# Patient Record
Sex: FEMALE | Race: WHITE | NOT HISPANIC OR LATINO | Employment: OTHER | URBAN - METROPOLITAN AREA
[De-identification: names, ages, dates, MRNs, and addresses within clinical notes are randomized per-mention and may not be internally consistent; named-entity substitution may affect disease eponyms.]

---

## 2020-12-29 ENCOUNTER — HOSPITAL ENCOUNTER (EMERGENCY)
Facility: OTHER | Age: 85
Discharge: HOME | End: 2020-12-29
Attending: EMERGENCY MEDICINE
Payer: MEDICARE

## 2020-12-29 VITALS
OXYGEN SATURATION: 97 % | WEIGHT: 170 LBS | BODY MASS INDEX: 31.28 KG/M2 | HEIGHT: 62 IN | HEART RATE: 77 BPM | RESPIRATION RATE: 20 BRPM | SYSTOLIC BLOOD PRESSURE: 119 MMHG | TEMPERATURE: 98 F | DIASTOLIC BLOOD PRESSURE: 58 MMHG

## 2020-12-29 DIAGNOSIS — R50.9 FEVER: ICD-10-CM

## 2020-12-29 DIAGNOSIS — S00.83XA CONTUSION OF FACE, INITIAL ENCOUNTER: Primary | ICD-10-CM
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
## 2020-12-29 LAB
ALBUMIN SERPL BCP-MCNC: 3.6 G/DL (ref 3.5–5.2)
ALP SERPL-CCNC: 112 U/L (ref 55–135)
ALT SERPL W/O P-5'-P-CCNC: 14 U/L (ref 10–44)
ANION GAP SERPL CALC-SCNC: 10 MMOL/L (ref 8–16)
AST SERPL-CCNC: 15 U/L (ref 10–40)
BACTERIA #/AREA URNS HPF: NORMAL /HPF
BASOPHILS # BLD AUTO: 0.03 K/UL (ref 0–0.2)
BASOPHILS NFR BLD: 0.3 % (ref 0–1.9)
BILIRUB SERPL-MCNC: 0.5 MG/DL (ref 0.1–1)
BILIRUB UR QL STRIP: NEGATIVE
BUN SERPL-MCNC: 16 MG/DL (ref 8–23)
CALCIUM SERPL-MCNC: 9.3 MG/DL (ref 8.7–10.5)
CHLORIDE SERPL-SCNC: 103 MMOL/L (ref 95–110)
CLARITY UR: CLEAR
CO2 SERPL-SCNC: 23 MMOL/L (ref 23–29)
COLOR UR: YELLOW
CREAT SERPL-MCNC: 0.8 MG/DL (ref 0.5–1.4)
CTP QC/QA: YES
DIFFERENTIAL METHOD: ABNORMAL
EOSINOPHIL # BLD AUTO: 0 K/UL (ref 0–0.5)
EOSINOPHIL NFR BLD: 0.1 % (ref 0–8)
ERYTHROCYTE [DISTWIDTH] IN BLOOD BY AUTOMATED COUNT: 13.4 % (ref 11.5–14.5)
EST. GFR  (AFRICAN AMERICAN): >60 ML/MIN/1.73 M^2
EST. GFR  (NON AFRICAN AMERICAN): >60 ML/MIN/1.73 M^2
GLUCOSE SERPL-MCNC: 167 MG/DL (ref 70–110)
GLUCOSE UR QL STRIP: NEGATIVE
HCT VFR BLD AUTO: 40.9 % (ref 37–48.5)
HGB BLD-MCNC: 13.4 G/DL (ref 12–16)
HGB UR QL STRIP: ABNORMAL
IMM GRANULOCYTES # BLD AUTO: 0.05 K/UL (ref 0–0.04)
IMM GRANULOCYTES NFR BLD AUTO: 0.4 % (ref 0–0.5)
KETONES UR QL STRIP: NEGATIVE
LACTATE SERPL-SCNC: 2.7 MMOL/L (ref 0.5–2.2)
LEUKOCYTE ESTERASE UR QL STRIP: NEGATIVE
LYMPHOCYTES # BLD AUTO: 0.9 K/UL (ref 1–4.8)
LYMPHOCYTES NFR BLD: 7.4 % (ref 18–48)
MCH RBC QN AUTO: 31.5 PG (ref 27–31)
MCHC RBC AUTO-ENTMCNC: 32.8 G/DL (ref 32–36)
MCV RBC AUTO: 96 FL (ref 82–98)
MICROSCOPIC COMMENT: NORMAL
MONOCYTES # BLD AUTO: 0.7 K/UL (ref 0.3–1)
MONOCYTES NFR BLD: 5.9 % (ref 4–15)
NEUTROPHILS # BLD AUTO: 10.2 K/UL (ref 1.8–7.7)
NEUTROPHILS NFR BLD: 85.9 % (ref 38–73)
NITRITE UR QL STRIP: NEGATIVE
NRBC BLD-RTO: 0 /100 WBC
PH UR STRIP: 6 [PH] (ref 5–8)
PLATELET # BLD AUTO: 193 K/UL (ref 150–350)
PMV BLD AUTO: 9.4 FL (ref 9.2–12.9)
POTASSIUM SERPL-SCNC: 4.6 MMOL/L (ref 3.5–5.1)
PROT SERPL-MCNC: 7 G/DL (ref 6–8.4)
PROT UR QL STRIP: NEGATIVE
RBC # BLD AUTO: 4.25 M/UL (ref 4–5.4)
RBC #/AREA URNS HPF: 1 /HPF (ref 0–4)
SARS-COV-2 RDRP RESP QL NAA+PROBE: NEGATIVE
SODIUM SERPL-SCNC: 136 MMOL/L (ref 136–145)
SP GR UR STRIP: 1.01 (ref 1–1.03)
SQUAMOUS #/AREA URNS HPF: 10 /HPF
URN SPEC COLLECT METH UR: ABNORMAL
UROBILINOGEN UR STRIP-ACNC: NEGATIVE EU/DL
WBC # BLD AUTO: 11.88 K/UL (ref 3.9–12.7)
WBC #/AREA URNS HPF: 2 /HPF (ref 0–5)

## 2020-12-29 PROCEDURE — 96361 HYDRATE IV INFUSION ADD-ON: CPT

## 2020-12-29 PROCEDURE — U0002 COVID-19 LAB TEST NON-CDC: HCPCS | Performed by: EMERGENCY MEDICINE

## 2020-12-29 PROCEDURE — 85025 COMPLETE CBC W/AUTO DIFF WBC: CPT

## 2020-12-29 PROCEDURE — 99285 EMERGENCY DEPT VISIT HI MDM: CPT | Mod: 25

## 2020-12-29 PROCEDURE — 93010 EKG 12-LEAD: ICD-10-PCS | Mod: ,,, | Performed by: INTERNAL MEDICINE

## 2020-12-29 PROCEDURE — 87040 BLOOD CULTURE FOR BACTERIA: CPT

## 2020-12-29 PROCEDURE — 80053 COMPREHEN METABOLIC PANEL: CPT

## 2020-12-29 PROCEDURE — 83605 ASSAY OF LACTIC ACID: CPT

## 2020-12-29 PROCEDURE — 25000003 PHARM REV CODE 250: Performed by: EMERGENCY MEDICINE

## 2020-12-29 PROCEDURE — 96360 HYDRATION IV INFUSION INIT: CPT

## 2020-12-29 PROCEDURE — 93005 ELECTROCARDIOGRAM TRACING: CPT

## 2020-12-29 PROCEDURE — 93010 ELECTROCARDIOGRAM REPORT: CPT | Mod: ,,, | Performed by: INTERNAL MEDICINE

## 2020-12-29 PROCEDURE — 81000 URINALYSIS NONAUTO W/SCOPE: CPT

## 2020-12-29 RX ORDER — ACETAMINOPHEN 500 MG
1000 TABLET ORAL
Status: COMPLETED | OUTPATIENT
Start: 2020-12-29 | End: 2020-12-29

## 2020-12-29 RX ADMIN — ACETAMINOPHEN 1000 MG: 500 TABLET, FILM COATED ORAL at 05:12

## 2020-12-29 RX ADMIN — SODIUM CHLORIDE 500 ML: 0.9 INJECTION, SOLUTION INTRAVENOUS at 05:12

## 2020-12-29 NOTE — ED NOTES
"Pt complaining of pain to IV site started by EMS, removed per patient's request; IV attempt to RFA x 1 attempt, able to obtain blood but site unable to flush with saline; pt reports "I don't want you to stick me anymore, I'm too old for this"; bandages dry and intact, covering skin tears; no distress noted; will monitor closely  "

## 2020-12-29 NOTE — ED PROVIDER NOTES
Encounter Date: 12/29/2020    SCRIBE #1 NOTE: I, Bradford Banuelos, am scribing for, and in the presence of, Dr. Healy.       History     Chief Complaint   Patient presents with    Fall     pt was reaching for something and fell from wheel chair.  no loc has hematoma to left eye, skin tears that have bandages applied pta skin tear to left elbow, wrist, knee and calf     Time seen by provider: 2:43 PM    This is a 89 y.o. female who presents s/p fall that occurred approximately two hours ago. She was sitting her her wheelchair, leaning forward trying to pick something up off of the ground and fell forward. She has a hematoma to the left eye with abrasions to the left arm and bilateral lower extremities. She states that her teeth feel aligned.  Denies neck pain or back pain.  Denies LOC.  This is the extent of the patient's complaints at this time.    Of note, patient supposedly had a fever at the nursing home today and also a fever with EMS.  She is afebrile here.  The patient denies any symptoms.  She did receive her COVID vaccine yesterday.    The history is provided by the patient.     Review of patient's allergies indicates:   Allergen Reactions    Aspirin     Felodipine     Morphine      Past Medical History:   Diagnosis Date    Arthritis     Hip fracture, right     History of breast surgery      Past Surgical History:   Procedure Laterality Date    BREAST SURGERY      HIP SURGERY      HYSTERECTOMY       No family history on file.  Social History     Tobacco Use    Smoking status: Never Smoker    Smokeless tobacco: Never Used   Substance Use Topics    Alcohol use: No    Drug use: No     Review of Systems   Constitutional: Negative for fever.   HENT: Positive for facial swelling. Negative for dental problem and sore throat.         Positive for swelling to the left eye.   Respiratory: Negative for shortness of breath.    Cardiovascular: Negative for chest pain.   Gastrointestinal: Negative for nausea.    Genitourinary: Negative for dysuria.   Musculoskeletal: Negative for back pain.   Skin: Positive for wound. Negative for rash.        Positive for scattered abarsions   Neurological: Negative for weakness.   Hematological: Does not bruise/bleed easily.       Physical Exam     Initial Vitals [12/29/20 1344]   BP Pulse Resp Temp SpO2   121/75 (!) 112 (!) 24 98.9 °F (37.2 °C) 98 %      MAP       --         Physical Exam    Nursing note and vitals reviewed.  Constitutional: She appears well-developed and well-nourished. She is not diaphoretic. No distress.   HENT:   Head: Normocephalic.   Significant periorbital swelling and ecchymosis.  No chemosis or injected sclera.  No crepitus or step-offs.    Eyes: EOM are normal. Right eye exhibits no discharge. Left eye exhibits no discharge.   Neck: Normal range of motion.   Cardiovascular: Normal rate, regular rhythm and normal heart sounds. Exam reveals no gallop.    Pulmonary/Chest: Breath sounds normal. No respiratory distress. She has no wheezes. She has no rhonchi. She has no rales.   Abdominal: Soft. Bowel sounds are normal. She exhibits no distension. There is no abdominal tenderness. There is no rebound and no guarding.   Musculoskeletal: Normal range of motion.      Comments: No C-T-L midline spinal tenderness. 2+ DP/PT pulses. 1+ pitting edema to the lower extremity.    Neurological: She is alert and oriented to person, place, and time.   Skin: Skin is warm and dry. Abscess noted. No rash noted. No erythema. No pallor.   6 cm skin tear to the left lower extremity, to the distal aspect of the shin. 2 cm abrasion to the left knee. 2 cm abrasion to the right knee. 3 cm skin tear to the left elbow.   Psychiatric: She has a normal mood and affect. Her behavior is normal. Judgment and thought content normal.         ED Course   Procedures  Labs Reviewed   CBC W/ AUTO DIFFERENTIAL - Abnormal; Notable for the following components:       Result Value    MCH 31.5 (*)      Gran # (ANC) 10.2 (*)     Immature Grans (Abs) 0.05 (*)     Lymph # 0.9 (*)     Gran % 85.9 (*)     Lymph % 7.4 (*)     All other components within normal limits   COMPREHENSIVE METABOLIC PANEL - Abnormal; Notable for the following components:    Glucose 167 (*)     All other components within normal limits   URINALYSIS - Abnormal; Notable for the following components:    Occult Blood UA 1+ (*)     All other components within normal limits   LACTIC ACID, PLASMA - Abnormal; Notable for the following components:    Lactate (Lactic Acid) 2.7 (*)     All other components within normal limits   CULTURE, BLOOD   CULTURE, BLOOD   URINALYSIS MICROSCOPIC   LACTIC ACID, PLASMA   SARS-COV-2 RDRP GENE    Narrative:     This test utilizes isothermal nucleic acid amplification   technology to detect the SARS-CoV-2 RdRp nucleic acid segment.   The analytical sensitivity (limit of detection) is 125 genome   equivalents/mL.   A POSITIVE result implies infection with the SARS-CoV-2 virus;   the patient is presumed to be contagious.     A NEGATIVE result means that SARS-CoV-2 nucleic acids are not   present above the limit of detection. A NEGATIVE result should be   treated as presumptive. It does not rule out the possibility of   COVID-19 and should not be the sole basis for treatment decisions.   If COVID-19 is strongly suspected based on clinical and exposure   history, re-testing using an alternate molecular assay should be   considered.   This test is only for use under the Food and Drug   Administration s Emergency Use Authorization (EUA).   Commercial kits are provided by [employer].   Performance characteristics of the EUA have been independently   verified by [facility] Department of   Pathology and Laboratory Medicine.   _________________________________________________________________   The authorized Fact Sheet for Healthcare Providers and the authorized Fact   Sheet for Patients of the ID NOW COVID-19  are available on the FDA   website:     https://www.fda.gov/media/802706/download  https://www.fda.gov/media/677521/download           EKG Readings: (Independently Interpreted)   14:36   Rate of 103. Sinus tachycardia. LBBB. No other ST or ischemic changes.     ECG Results          EKG 12-lead (Final result)  Result time 12/29/20 18:28:29    Final result by Interface, Lab In [facility] (12/29/20 18:28:29)                 Narrative:    Test Reason : W19.XXXA,    Vent. Rate : 103 BPM     Atrial Rate : 103 BPM     P-R Int : 188 ms          QRS Dur : 120 ms      QT Int : 344 ms       P-R-T Axes : 075 074 259 degrees     QTc Int : 450 ms    Sinus tachycardia  Incomplete left bundle branch block  ST and T wave abnormality, consider inferior ischemia  ST and T wave abnormality, consider anterolateral ischemia  Abnormal ECG    Confirmed by Apple Lew MD ([id number]) on 12/29/2020 6:28:20 PM    Referred By: [email or web address]   SELF           Confirmed By:Apple Lew MD                            Imaging Results          CT Maxillofacial Without Contrast (Final result)  Result time 12/29/20 16:32:45    Final result by Melony Quintana MD (12/29/20 16:32:45)                 Impression:      Left frontal subcutaneous hematoma extending into the preseptal space with no additional findings identified.      Electronically signed by: Melony Quintana MD  Date:    12/29/2020  Time:    16:32             Narrative:    EXAMINATION:  CT MAXILLOFACIAL WITHOUT CONTRAST    CLINICAL HISTORY:  Facial trauma;    TECHNIQUE:  Axial low-dose images, coronal and sagittal reformations were performed through the paranasal sinuses.  Contrast was not administered.    COMPARISON:  None.    FINDINGS:  There is minimal mucosal thickening in the bilateral maxillary sinuses.  Otherwise, the pneumatized aspects of the skull and skull base are well aerated.  There is suggestion of right middle turbinates ostomy.  The ostiomeatal complexes are patent bilaterally.   No significant nasal septal deviation is present.  The intraorbital contents are normal.  Global cerebral volume loss and findings compatible with chronic microvascular ischemic white matter change are identified in the visualized intracranial contents.  There is a small left frontal subcutaneous hematoma which extends into the preseptal space.  The osseous structures are normal.                               X-Ray Chest AP Portable (Final result)  Result time 12/29/20 15:42:43    Final result by Devon Aviles MD (12/29/20 15:42:43)                 Impression:      No acute abnormality.      Electronically signed by: Devon Aviles MD  Date:    12/29/2020  Time:    15:42             Narrative:    EXAMINATION:  XR CHEST AP PORTABLE    CLINICAL HISTORY:  Fever, unspecified    TECHNIQUE:  Single frontal view of the chest was performed.    COMPARISON:  09/17/2013    FINDINGS:  The lungs are clear, with normal appearance of pulmonary vasculature and no pleural effusion or pneumothorax.    The cardiac silhouette is normal in size. The hilar and mediastinal contours are unremarkable.    Bones are intact.                               CT Cervical Spine Without Contrast (Final result)  Result time 12/29/20 14:45:15    Final result by Mateo Combs MD (12/29/20 14:45:15)                 Impression:      1. No definite acute displaced cervical spine fracture.  2. Generalized height loss is noted involving the C4 through C7 vertebral bodies without retropulsion, favored to be on a chronic degenerative basis.  However, clinical correlation with point tenderness, with consideration of MRI for further evaluation, as clinically warranted.  3. Additionally, there is mild nonspecific widening of the anterior disc spaces at C3-4 and C4-5.  There is no widening of the facet joints at this level.  No significant prevertebral soft tissue swelling.  These findings may be on a chronic degenerative basis as well, however again  clinical correlation would be recommended, with consideration of MRI to evaluate for ligamentous injury, as clinically warranted.  4. Degenerative findings, as described.      Electronically signed by: Mateo Combs  Date:    12/29/2020  Time:    14:45             Narrative:    EXAMINATION:  CT CERVICAL SPINE WITHOUT CONTRAST    CLINICAL HISTORY:  C-spine fracture, traumatic;    TECHNIQUE:  Low dose axial images, sagittal and coronal reformations were performed though the cervical spine.  Contrast was not administered.    COMPARISON:  None    FINDINGS:  Fractures: No definite acute displaced fractures are noted.  There is generalized height loss of the C4 through C7 vertebral bodies, which may be on a chronic degenerative basis.  Node definite discrete fracture lines are appreciated.  No significant retropulsion.    Alignment: There is mild widening of the anterior disc space at C3-4 and C4-5 with normal alignment of the posterior elements.  Atlanto-axial and atlanto-occipital joints: Atlanto-axial and atlanto-occipital intervals are not widened.  Well-corticated ossific fragments at the posterior aspects of the Landau axial joints may be degenerative or possibly sequela of remote trauma.  Facet joints: There is no traumatic facet joint widening.  Multilevel degenerate facet arthropathy.  Vertebral bodies: Diffuse osseous demineralization.  Discs: Disc osteophyte complex is noted at multiple levels.  Spinal canal and foraminal narrowing: Although CT does not optimally evaluate the soft tissue contents of the spinal canal and foramina, no critical spinal canal stenosis is suggested.    At C3-4, C4-5 there is moderate severe bilateral foraminal stenosis related to uncinate hypertrophy and facet arthropathy.  At C5-6, there is severe bilateral foraminal stenosis late to uncinate hypertrophy and facet arthropathy.  Paraspinal soft tissues: Atherosclerotic vascular calcifications are noted.  Heterogeneous thyroid  gland.    Upper Lungs:Clear                               CT Head Without Contrast (Final result)  Result time 12/29/20 14:31:29    Final result by Mateo Combs MD (12/29/20 14:31:29)                 Impression:      No acute intracranial findings.      Electronically signed by: Mateo Combs  Date:    12/29/2020  Time:    14:31             Narrative:    EXAMINATION:  CT HEAD WITHOUT CONTRAST    CLINICAL HISTORY:  Head trauma, minor (Age => 65y);    TECHNIQUE:  Low dose axial images were obtained through the head.  Coronal and sagittal reformations were also performed. Contrast was not administered.    COMPARISON:  None.    FINDINGS:  Blood: No acute intracranial hemorrhage.    Parenchyma: No definite loss of gray-white differentiation to suggest acute or subacute transcortical infarct. Generalized age-related parenchymal volume loss is noted.  Multifocal areas of white matter hypoattenuation may represent sequela of chronic small vessel ischemic disease.    Ventricles/Extra-axial spaces: No abnormal extra-axial fluid collection. Basal cisterns are patent.    Vessels: Calcific atherosclerosis    Orbits: Unremarkable.    Scalp: Left frontal and supraorbital scalp hematoma and surrounding soft tissue contusion.    Skull: There are no depressed skull fractures or destructive bone lesions.    Sinuses and mastoids: Findings suggestive of prior right-sided endoscopic sinus surgery.  Scattered relatively minor degree of mucosal thickening without definite fluid levels.  Equivocal small perforation of the anterior nasal septum, incompletely characterized.    Other findings: None                              X-Rays:   Independently Interpreted Readings:   Chest X-Ray: Trachea midline. No cardiomegaly. No effusion, edema or pneumothorax.      Medical Decision Making:   History:   I obtained history from: EMS provider.  Old Medical Records: I decided to obtain old medical records.  Old Records Summarized: other records  and records from clinic visits.  Initial Assessment:   2:43PM:  Patient is a an 89-year-old female who presents to the emergency department after a fall from her wheelchair.  Patient appears well, nontoxic.  She has no CTL midline tenderness.  She does have some left periorbital swelling and ecchymosis.  Will plan for imaging.  Patient also was supposedly febrile earlier today.  She is afebrile here with normal vitals.  However she did receive her COVID vaccine yesterday which could have triggered a fever.  However given her age and comorbidities, will plan for labs, cultures, will continue to follow and reassess.  Independently Interpreted Test(s):   I have ordered and independently interpreted X-rays - see prior notes.  I have ordered and independently interpreted EKG Reading(s) - see prior notes  Clinical Tests:   Lab Tests: Ordered and Reviewed  Radiological Study: Ordered and Reviewed  Medical Tests: Ordered and Reviewed    6:25PM:  Patient doing well, remains stable.  Her x-rays and CTs are negative for any acute injury.  Her labs otherwise unremarkable with the exception of a mildly elevated lactic acid of 2.7.  However there is no evidence of infection with her urine or chest x-ray.  Will plan for a repeat lactic acid.  I updated the patient and the family regarding the results along with plan.  Agreeable to plan and all questions answered.    7:12 PM:  Patient no longer wanting a repeat blood draw for the lactic acid.  Son states that the patient is just tired of being stuck.  They requested just to be discharged home as the patient feels ready to go back to her nursing home.  I did explain to him that the elevated lactic acid could indicate a systemic infection, and that it would be prudent to repeat a level to make sure it was improving.  However the patient and son are both competent and has full decision-making capacity.  They do understand they can return at any time for any concerns.  Her workup  otherwise has been negative.  Given that, will plan to discharge back to the nursing home in stable condition.  I do suspect that the fever is likely a reaction to the COVID vaccine.  I updated the patient and the family regarding the results and I counseled them regarding supportive care measures. I have discussed with the pt ED return warnings and need for close PCP f/u.  Pt agreeable to plan and all questions answered.  I feel that pt is stable for discharge and management as an outpatient and no further intervention is needed at this time.  Pt is comfortable returning to the ED if needed.  Will DC home in stable condition.              Scribe Attestation:   Scribe #1: I performed the above scribed service and the documentation accurately describes the services I performed. I attest to the accuracy of the note.    Attending Attestation:           Physician Attestation for Scribe:  Physician Attestation Statement for Scribe #1: I, Dr. Healy, reviewed documentation, as scribed by Bradford Banuelos in my presence, and it is both accurate and complete.                           Clinical Impression:       ICD-10-CM ICD-9-CM   1. Contusion of face, initial encounter  S00.83XA 920   2. Fall  W19.XXXA E888.9   3. Fever  R50.9 780.60                          ED Disposition Condition    Discharge Stable        ED Prescriptions     None        Follow-up Information     Follow up With Specialties Details Why Contact Info    Primary Care Physician                                           Evette Healy MD  12/29/20 1943       Evette Healy MD  12/29/20 1943

## 2021-01-03 LAB — BACTERIA BLD CULT: NORMAL

## 2021-07-12 ENCOUNTER — HOSPITAL ENCOUNTER (OUTPATIENT)
Dept: RADIOLOGY | Facility: HOSPITAL | Age: 86
Discharge: HOME | End: 2021-07-12
Attending: PHYSICIAN ASSISTANT
Payer: MEDICARE

## 2021-07-12 ENCOUNTER — OFFICE VISIT (OUTPATIENT)
Dept: ORTHOPEDICS | Facility: CLINIC | Age: 86
End: 2021-07-12
Payer: MEDICARE

## 2021-07-12 VITALS — WEIGHT: 176.5 LBS | BODY MASS INDEX: 30.13 KG/M2 | HEIGHT: 64 IN

## 2021-07-12 DIAGNOSIS — M25.562 PAIN IN BOTH KNEES, UNSPECIFIED CHRONICITY: ICD-10-CM

## 2021-07-12 DIAGNOSIS — M25.561 PAIN IN BOTH KNEES, UNSPECIFIED CHRONICITY: ICD-10-CM

## 2021-07-12 DIAGNOSIS — M17.0 PRIMARY OSTEOARTHRITIS OF BOTH KNEES: Primary | ICD-10-CM

## 2021-07-12 PROCEDURE — 1159F PR MEDICATION LIST DOCUMENTED IN MEDICAL RECORD: ICD-10-PCS | Mod: S$GLB,,, | Performed by: PHYSICIAN ASSISTANT

## 2021-07-12 PROCEDURE — 73562 X-RAY EXAM OF KNEE 3: CPT | Mod: TC,50

## 2021-07-12 PROCEDURE — 20610 LARGE JOINT ASPIRATION/INJECTION: BILATERAL KNEE: ICD-10-PCS | Mod: 50,S$GLB,, | Performed by: PHYSICIAN ASSISTANT

## 2021-07-12 PROCEDURE — 99999 PR PBB SHADOW E&M-EST. PATIENT-LVL IV: CPT | Mod: PBBFAC,,, | Performed by: PHYSICIAN ASSISTANT

## 2021-07-12 PROCEDURE — 1157F PR ADVANCE CARE PLAN OR EQUIV PRESENT IN MEDICAL RECORD: ICD-10-PCS | Mod: S$GLB,,, | Performed by: PHYSICIAN ASSISTANT

## 2021-07-12 PROCEDURE — 1125F AMNT PAIN NOTED PAIN PRSNT: CPT | Mod: S$GLB,,, | Performed by: PHYSICIAN ASSISTANT

## 2021-07-12 PROCEDURE — 73562 X-RAY EXAM OF KNEE 3: CPT | Mod: 26,LT,, | Performed by: RADIOLOGY

## 2021-07-12 PROCEDURE — 73562 PR  X-RAY KNEE 3 VIEW: ICD-10-PCS | Mod: 26,LT,, | Performed by: RADIOLOGY

## 2021-07-12 PROCEDURE — 1125F PR PAIN SEVERITY QUANTIFIED, PAIN PRESENT: ICD-10-PCS | Mod: S$GLB,,, | Performed by: PHYSICIAN ASSISTANT

## 2021-07-12 PROCEDURE — 73562 X-RAY EXAM OF KNEE 3: CPT | Mod: 26,RT,, | Performed by: RADIOLOGY

## 2021-07-12 PROCEDURE — 20610 DRAIN/INJ JOINT/BURSA W/O US: CPT | Mod: 50,S$GLB,, | Performed by: PHYSICIAN ASSISTANT

## 2021-07-12 PROCEDURE — 99203 PR OFFICE/OUTPT VISIT, NEW, LEVL III, 30-44 MIN: ICD-10-PCS | Mod: 25,S$GLB,, | Performed by: PHYSICIAN ASSISTANT

## 2021-07-12 PROCEDURE — 99203 OFFICE O/P NEW LOW 30 MIN: CPT | Mod: 25,S$GLB,, | Performed by: PHYSICIAN ASSISTANT

## 2021-07-12 PROCEDURE — 1157F ADVNC CARE PLAN IN RCRD: CPT | Mod: S$GLB,,, | Performed by: PHYSICIAN ASSISTANT

## 2021-07-12 PROCEDURE — 1159F MED LIST DOCD IN RCRD: CPT | Mod: S$GLB,,, | Performed by: PHYSICIAN ASSISTANT

## 2021-07-12 PROCEDURE — 99999 PR PBB SHADOW E&M-EST. PATIENT-LVL IV: ICD-10-PCS | Mod: PBBFAC,,, | Performed by: PHYSICIAN ASSISTANT

## 2021-07-12 RX ORDER — LACTULOSE 10 G/15ML
20 SOLUTION ORAL; RECTAL DAILY PRN
Status: ON HOLD | COMMUNITY
Start: 2021-03-22 | End: 2023-03-09 | Stop reason: HOSPADM

## 2021-07-12 RX ORDER — TRIAMCINOLONE ACETONIDE 40 MG/ML
40 INJECTION, SUSPENSION INTRA-ARTICULAR; INTRAMUSCULAR
Status: DISCONTINUED | OUTPATIENT
Start: 2021-07-12 | End: 2021-07-12 | Stop reason: HOSPADM

## 2021-07-12 RX ORDER — METHENAMINE HIPPURATE 1000 MG/1
1 TABLET ORAL 2 TIMES DAILY
COMMUNITY
Start: 2021-06-15 | End: 2023-03-05 | Stop reason: CLARIF

## 2021-07-12 RX ORDER — POTASSIUM CHLORIDE 20 MEQ/1
TABLET, EXTENDED RELEASE ORAL
COMMUNITY
Start: 2021-06-11 | End: 2023-03-05 | Stop reason: SDUPTHER

## 2021-07-12 RX ORDER — IBUPROFEN 400 MG/1
400 TABLET ORAL 3 TIMES DAILY
COMMUNITY
Start: 2021-06-23 | End: 2022-11-25

## 2021-07-12 RX ORDER — OLOPATADINE HYDROCHLORIDE 2 MG/ML
SOLUTION/ DROPS OPHTHALMIC
COMMUNITY
Start: 2021-07-07 | End: 2023-03-05 | Stop reason: CLARIF

## 2021-07-12 RX ORDER — FUROSEMIDE 20 MG/1
20 TABLET ORAL 2 TIMES DAILY
COMMUNITY
Start: 2021-07-11 | End: 2023-03-05 | Stop reason: ALTCHOICE

## 2021-07-12 RX ORDER — MIRABEGRON 50 MG/1
1 TABLET, FILM COATED, EXTENDED RELEASE ORAL DAILY
COMMUNITY
Start: 2021-07-01

## 2021-07-12 RX ORDER — HYDROCORTISONE 25 MG/G
CREAM TOPICAL
COMMUNITY
Start: 2021-03-03 | End: 2023-03-05 | Stop reason: CLARIF

## 2021-07-12 RX ADMIN — TRIAMCINOLONE ACETONIDE 40 MG: 40 INJECTION, SUSPENSION INTRA-ARTICULAR; INTRAMUSCULAR at 09:07

## 2022-11-24 ENCOUNTER — HOSPITAL ENCOUNTER (EMERGENCY)
Facility: HOSPITAL | Age: 87
Discharge: HOME | End: 2022-11-25
Attending: EMERGENCY MEDICINE
Payer: MEDICARE

## 2022-11-24 DIAGNOSIS — S01.81XA FOREHEAD LACERATION, INITIAL ENCOUNTER: Primary | ICD-10-CM

## 2022-11-24 DIAGNOSIS — I60.9 SAH (SUBARACHNOID HEMORRHAGE): ICD-10-CM
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
## 2022-11-24 DIAGNOSIS — S80.01XA CONTUSION OF RIGHT KNEE: ICD-10-CM

## 2022-11-24 PROBLEM — S06.6XAA TRAUMATIC SUBARACHNOID HEMORRHAGE WITH UNKNOWN LOSS OF CONSCIOUSNESS STATUS: Status: ACTIVE | Noted: 2022-11-24

## 2022-11-24 PROCEDURE — 99284 EMERGENCY DEPT VISIT MOD MDM: CPT | Mod: 25,,, | Performed by: EMERGENCY MEDICINE

## 2022-11-24 PROCEDURE — 99284 PR EMERGENCY DEPT VISIT,LEVEL IV: ICD-10-PCS | Mod: 25,,, | Performed by: EMERGENCY MEDICINE

## 2022-11-24 PROCEDURE — 12013 RPR F/E/E/N/L/M 2.6-5.0 CM: CPT

## 2022-11-24 PROCEDURE — 12013 RPR F/E/E/N/L/M 2.6-5.0 CM: CPT | Mod: ,,, | Performed by: EMERGENCY MEDICINE

## 2022-11-24 PROCEDURE — 12013 PR RESUPERF WND FACE 2.6-5 CM: ICD-10-PCS | Mod: ,,, | Performed by: EMERGENCY MEDICINE

## 2022-11-24 PROCEDURE — 25000003 PHARM REV CODE 250: Performed by: EMERGENCY MEDICINE

## 2022-11-24 PROCEDURE — 99284 EMERGENCY DEPT VISIT MOD MDM: CPT | Mod: 25

## 2022-11-24 RX ORDER — LIDOCAINE HYDROCHLORIDE 10 MG/ML
5 INJECTION, SOLUTION EPIDURAL; INFILTRATION; INTRACAUDAL; PERINEURAL
Status: DISCONTINUED | OUTPATIENT
Start: 2022-11-24 | End: 2022-11-24

## 2022-11-24 RX ORDER — LIDOCAINE HYDROCHLORIDE 10 MG/ML
10 INJECTION, SOLUTION EPIDURAL; INFILTRATION; INTRACAUDAL; PERINEURAL
Status: COMPLETED | OUTPATIENT
Start: 2022-11-24 | End: 2022-11-24

## 2022-11-24 RX ADMIN — LIDOCAINE HYDROCHLORIDE 100 MG: 10 INJECTION, SOLUTION EPIDURAL; INFILTRATION; INTRACAUDAL; PERINEURAL at 08:11

## 2022-11-25 VITALS
HEART RATE: 77 BPM | SYSTOLIC BLOOD PRESSURE: 121 MMHG | BODY MASS INDEX: 29.18 KG/M2 | OXYGEN SATURATION: 97 % | WEIGHT: 170 LBS | TEMPERATURE: 99 F | DIASTOLIC BLOOD PRESSURE: 56 MMHG | RESPIRATION RATE: 16 BRPM

## 2022-11-25 PROCEDURE — 99284 PR EMERGENCY DEPT VISIT,LEVEL IV: ICD-10-PCS | Mod: ,,, | Performed by: PHYSICIAN ASSISTANT

## 2022-11-25 PROCEDURE — 25000003 PHARM REV CODE 250: Performed by: EMERGENCY MEDICINE

## 2022-11-25 PROCEDURE — 99284 EMERGENCY DEPT VISIT MOD MDM: CPT | Mod: ,,, | Performed by: PHYSICIAN ASSISTANT

## 2022-11-25 RX ORDER — BACITRACIN ZINC 500 [USP'U]/G
1 OINTMENT TOPICAL
Status: COMPLETED | OUTPATIENT
Start: 2022-11-25 | End: 2022-11-25

## 2022-11-25 RX ORDER — ACETAMINOPHEN 325 MG/1
650 TABLET ORAL
Status: COMPLETED | OUTPATIENT
Start: 2022-11-25 | End: 2022-11-25

## 2022-11-25 RX ADMIN — ACETAMINOPHEN 650 MG: 325 TABLET ORAL at 04:11

## 2022-11-25 RX ADMIN — BACITRACIN 1 EACH: 500 OINTMENT TOPICAL at 04:11

## 2022-11-25 NOTE — CONSULTS
[facility] - Emergency Dept  Neurosurgery  Consult Note    Consults  Subjective:     Chief Complaint/Reason for Admission: tSAH    History of Present Illness: 89-year-old female who is very hard of hearing presents from nursing home with head lac after mechanical fall today.  EMS states pt fell when trying to stand from a wheelchair and hit her head on the ground. Patient is unable to give any history except that she hit her head. Unknown LOC. Denies N/V or new focal deficits. Only complaint is facial pain. Surrounding laceration. Family denies pt taking any blood thinners. NSGY consulted for small left frontal tSAH.       (Not in a hospital admission)      Review of patient's allergies indicates:   Allergen Reactions    Aspirin     Felodipine     Morphine        Past Medical History:   Diagnosis Date    Arthritis     Hip fracture, right     History of breast surgery      Past Surgical History:   Procedure Laterality Date    BREAST SURGERY      HIP SURGERY      HYSTERECTOMY       Family History    None       Tobacco Use    Smoking status: Never    Smokeless tobacco: Never   Substance and Sexual Activity    Alcohol use: No    Drug use: No    Sexual activity: Not on file     Review of Systems  Objective:     Weight: 77.1 kg (170 lb)  Body mass index is 29.18 kg/m².  Vital Signs (Most Recent):  Temp: 98.9 °F (37.2 °C) (11/24/22 2019)  Pulse: 81 (11/24/22 2310)  Resp: 18 (11/24/22 2310)  BP: (!) 125/58 (11/24/22 2310)  SpO2: 98 % (11/24/22 2310)   Vital Signs (24h Range):  Temp:  [98.9 °F (37.2 °C)] 98.9 °F (37.2 °C)  Pulse:  [77-81] 81  Resp:  [18-20] 18  SpO2:  [97 %-98 %] 98 %  BP: (125-155)/(58-68) 125/58       Physical Exam  General: well developed, well nourished, no distress.   Head: normocephalic, right forehead laceration repaired with surrounding ecchymosis and edema, periorbital ecchymosis on the right   Neurologic: Alert and oriented. Thought content appropriate.  GCS: Motor: 6/Verbal: 5/Eyes: 4  GCS Total: 15  Mental Status: Awake, Alert, Oriented x3  Cranial nerves: face symmetric, tongue midline, CN II-XII grossly intact.   Eyes: pupils equal, round, reactive to light with accomodation, EOMI.   Sensory: intact to light touch throughout  Motor Strength:Moves all extremities spontaneously with good tone at least antigravity. No abnormal movements seen.   DTR's - 2 + and symmetric in UE and LE  Pronator Drift: no drift noted  Finger-to-nose: Intact bilaterally    Significant Labs:  No results for input(s): GLU, NA, K, CL, CO2, BUN, CREATININE, CALCIUM, MG in the last 48 hours.  No results for input(s): WBC, HGB, HCT, PLT in the last 48 hours.  No results for input(s): LABPT, INR, APTT in the last 48 hours.  Microbiology Results (last 7 days)       ** No results found for the last 168 hours. **          All pertinent labs from the last 24 hours have been reviewed.    Significant Diagnostics:  CT: CT Head Without Contrast    Result Date: 11/24/2022  Small amount of subarachnoid hemorrhage on the left, as discussed above. Otherwise chronic appearing intracranial changes are noted. Extracranial frontal soft tissue injury is noted, there is no evidence for underlying calvarial fracture. Suspected soft tissue nodule or sebaceous cyst of the subcutaneous fat planes of the superior left paramidline posterior vertex region, as discussed above. Paranasal sinus findings as above. This report was flagged in Epic as abnormal. Findings were reported to Dr. Simmons in the ER at the time of dictation. Electronically signed by: David Gonzales Date:    11/24/2022 Time:    21:45     Assessment/Plan:     Traumatic subarachnoid hemorrhage with unknown loss of consciousness status  89 yo female, very hard of hearing, who presents s/p mechanical fall with head injury and tiny left frontal tSAH seen on CTH. Pt remains at her neuro baseline.    -No acute neurosurgical intervention  -Recommend 6h stability CTH  -q2 hour neuro  checks  -All labs and diagnostics reviewed  -CBC, PT/INR, PTT  -SBP <160   -Na >135  -If repeat CTH stable, no follow up necessary. Continue to monitor clinically, notify NSGY immediately with any changes in neuro status  -Will review with attending staff Dr. Johnnie Mcgrath, PA-C  Neurosurgery  [facility] - Emergency Dept

## 2022-11-25 NOTE — HPI
89-year-old female who is very hard of hearing presents from nursing home with head lac after mechanical fall today.  EMS states pt fell when trying to stand from a wheelchair and hit her head on the ground. Patient is unable to give any history except that she hit her head. Unknown LOC. Denies N/V or new focal deficits. Only complaint is facial pain. Surrounding laceration. Family denies pt taking any blood thinners. NSGY consulted for small left frontal tSAH.

## 2022-11-25 NOTE — ED PROVIDER NOTES
Encounter Date: 11/24/2022       History     Chief Complaint   Patient presents with    Fall     Coming from [place]. Pt stood from wheelchair and fell from a standing position and struck her head. Laceration noted to forehead. Unwitnessed fall so unknown of LOC. - blood thinners.      89-year-old female who is very hard of hearing presents from nursing home.  Report is that she was getting up from a wheelchair when she fell forward and hit her head.  No further history is available.  Patient is unable to give any history except that she hit her head.  Unable to get review of systems.  The patients available PMH, PSH, Social History, medications, allergies, and triage vital signs were reviewed just prior to their medical evaluation.       Review of patient's allergies indicates:   Allergen Reactions    Aspirin     Felodipine     Morphine      Past Medical History:   Diagnosis Date    Arthritis     Hip fracture, right     History of breast surgery      Past Surgical History:   Procedure Laterality Date    BREAST SURGERY      HIP SURGERY      HYSTERECTOMY       History reviewed. No pertinent family history.  Social History     Tobacco Use    Smoking status: Never    Smokeless tobacco: Never   Substance Use Topics    Alcohol use: No    Drug use: No     Review of Systems   Unable to perform ROS: Age     Physical Exam     Initial Vitals   BP Pulse Resp Temp SpO2   11/24/22 2016 11/24/22 2016 11/24/22 2016 11/24/22 2019 11/24/22 2016   (!) 155/68 77 20 98.9 °F (37.2 °C) 97 %      MAP       --                Physical Exam    Nursing note and vitals reviewed.  Constitutional: She appears well-developed and well-nourished. She is not diaphoretic. No distress.   HENT:   Nose: Nose normal.   3cm laceration to right forehead, very hard of hearing   Eyes: Conjunctivae are normal. Right eye exhibits no discharge. Left eye exhibits no discharge.   Neck: Neck supple.   No midline neck pain   Normal range of  motion.  Cardiovascular:  Normal rate, regular rhythm and normal heart sounds.     Exam reveals no gallop and no friction rub.       No murmur heard.  Pulmonary/Chest: Breath sounds normal. No respiratory distress. She has no wheezes. She has no rhonchi. She has no rales.   Abdominal: Abdomen is soft. She exhibits no distension. There is no abdominal tenderness. There is no rebound and no guarding.   Musculoskeletal:         General: No tenderness or edema. Normal range of motion.      Cervical back: Normal range of motion and neck supple.      Comments: Palpated all extremities, no ttp     Neurological: She is alert and oriented to person, place, and time. GCS score is 15. GCS eye subscore is 4. GCS verbal subscore is 5. GCS motor subscore is 6.   Skin: Skin is warm and dry. No rash noted. No erythema.   Psychiatric: She has a normal mood and affect. Her behavior is normal. Judgment and thought content normal.       ED Course   Lac Repair    Date/Time: 11/24/2022 10:05 PM  Performed by: Luis Simmons MD  Authorized by: Luis Simmons MD     Consent:     Consent obtained:  Verbal  Universal protocol:     Procedure explained and questions answered to patient or proxy's satisfaction: yes      Relevant documents present and verified: yes      Patient identity confirmed:  Verbally with patient  Anesthesia:     Anesthesia method:  Local infiltration    Local anesthetic:  Lidocaine 1% w/o epi  Laceration details:     Location:  Face    Face location:  Forehead    Length (cm):  3  Pre-procedure details:     Preparation:  Patient was prepped and draped in usual sterile fashion and imaging obtained to evaluate for foreign bodies  Exploration:     Limited defect created (wound extended): no      Hemostasis achieved with:  Direct pressure    Imaging outcome: foreign body not noted      Wound exploration: wound explored through full range of motion and entire depth of wound visualized      Wound extent: no foreign  bodies/material noted, no muscle damage noted, no underlying fracture noted and no vascular damage noted      Contaminated: no    Treatment:     Area cleansed with:  Saline    Amount of cleaning:  Standard    Irrigation solution:  Sterile saline    Irrigation volume:  100    Irrigation method:  Pressure wash    Visualized foreign bodies/material removed: no      Debridement:  None    Undermining:  None    Scar revision: no    Skin repair:     Repair method:  Sutures    Suture size:  4-0    Suture material:  Nylon    Suture technique:  Simple interrupted    Number of sutures:  5  Approximation:     Approximation:  Close  Repair type:     Repair type:  Simple  Post-procedure details:     Dressing:  Antibiotic ointment    Procedure completion:  Tolerated well, no immediate complications  Labs Reviewed   HIV 1 / 2 ANTIBODY   HEPATITIS C ANTIBODY          Imaging Results              CT Head Without Contrast (Final result)  Result time 11/25/22 04:07:49      Final result by David Gonzales MD (11/25/22 04:07:49)                   Impression:      Previously identified suspected small focus of subarachnoid hemorrhage appears stable without evidence for significant interval detrimental change.    Chronic appearing intracranial changes are noted.    Stable extracranial soft tissue focus, as above, likely representing a soft tissue nodule or sebaceous cyst of the subcutaneous fat planes.    Electronically signed by resident: Avni Hinojosa  Date:    11/25/2022  Time:    03:40    Electronically signed by: David Gonzales  Date:    11/25/2022  Time:    04:07               Narrative:    EXAMINATION:  CT HEAD WITHOUT CONTRAST    CLINICAL HISTORY:  Subarachnoid hemorrhage (SAH) suspected;    TECHNIQUE:  Low dose axial CT images obtained throughout the head without the use of intravenous contrast.  Axial, sagittal and coronal reconstructions were performed.    COMPARISON:  CT head 11/24/2022,  12/29/2020    FINDINGS:  INTRACRANIAL COMPARTMENT:    Prominence of the ventricles and sulci compatible with age-related generalized cerebral volume loss.  No hydrocephalus.  Midline falcine calcifications/mineralization noted.    Mild patchy hypoattenuation in the supratentorial white matter, nonspecific but most likely reflecting chronic microvascular ischemic changes.  Small area of hyperdensity involving a sulcus of the left frontal lobe (axial series 2, image 21; coronal series 601, image 84), is again noted, appearing stable when compared to the most recent prior examination, and suspicious for a small area of subarachnoid hemorrhage.  No parenchymal mass, edema or major vascular distribution infarct.    No extra-axial blood or fluid collections.    SKULL/EXTRACRANIAL CONTENTS (limited evaluation):    No fracture.  Stable extracranial, mildly dense round focus again measuring 1.3 cm in the subcutaneous fat planes of the superior left paramidline vertex.  Mastoid air cells and paranasal sinuses appears stable.                                       X-Ray Knee 3 View Right (Final result)  Result time 11/24/22 22:36:52      Final result by Solis Thompson MD (11/24/22 22:36:52)                   Impression:      Mild degenerative changes with no acute radiographic abnormality.  See above comments.      Electronically signed by: Solis Thompson  Date:    11/24/2022  Time:    22:36               Narrative:    EXAMINATION:  XR KNEE 3 VIEW RIGHT    CLINICAL HISTORY:  Contusion of right knee, initial encounter    TECHNIQUE:  AP, lateral, and Merchant views of the right knee were performed.    COMPARISON:  07/12/2021    FINDINGS:  Alignment is satisfactory.  No acute fracture, subluxation or dislocation.  Small joint effusion.    Mild patellar spurring similar to the prior study.    Mild joint space narrowing of the medial compartment.                                        CT Head Without Contrast (Final result)  Result  time 11/24/22 21:45:12      Final result by David Gonzales MD (11/24/22 21:45:12)                   Impression:      Small amount of subarachnoid hemorrhage on the left, as discussed above.    Otherwise chronic appearing intracranial changes are noted.    Extracranial frontal soft tissue injury is noted, there is no evidence for underlying calvarial fracture.    Suspected soft tissue nodule or sebaceous cyst of the subcutaneous fat planes of the superior left paramidline posterior vertex region, as discussed above.    Paranasal sinus findings as above.    This report was flagged in Epic as abnormal.    Findings were reported to Dr. Simmons in the ER at the time of dictation.      Electronically signed by: David Gonzales  Date:    11/24/2022  Time:    21:45               Narrative:    EXAMINATION:  CT HEAD WITHOUT CONTRAST    CLINICAL HISTORY:  Facial trauma, blunt;    TECHNIQUE:  Low dose axial images were obtained through the head.  Coronal and sagittal reformations were also performed. Contrast was not administered.    COMPARISON:  CT examination of the brain without contrast December 29, 2020    FINDINGS:  The ventricular system and sulcal pattern demonstrate chronic involutional change.  Chronic appearing white matter changes noted.  Midline falcine calcifications/mineralization is noted.    As seen on axial image 20 and 21, sagittal image 129 through 131, coronal image 77, there is a small area of hyperdensity most consistent with a small area of subarachnoid hemorrhage involving a sulcus of the left frontal lobe.  A large amount of subarachnoid hemorrhage is not seen.  There is no evidence for parenchymal hemorrhage, no evidence for subdural epidural hemorrhage.    There is no evidence for intracranial mass, mass effect or midline shift.  Appropriate CSF spaces are seen at the skull base.    As seen on axial image 29 there is an area of extracranial mildly dense round object measuring approximately 12.3 mm  within the subcutaneous fat planes of the superior left paramidline vertex, this may relate to a soft tissue nodule or proteinaceous cyst, clinical and historical correlation is needed.    There is appearance of soft tissue injury overlying the frontal location including air density within the soft tissues that may relate to laceration, for which clinical and historical correlation is needed.  There is no evidence for underlying calvarial fracture.    The visualized osseous structures appear intact.  Chronic change noted.  The mastoid air cells appear well aerated.  Opacity along the external auditory canals may relate to cerumen.    There is appearance of may relate to prior paranasal sinus postoperative change.  Mild paranasal sinus mucosal thickening is noted.  Mild osseous wall thickening of the right maxillary antrum may relate to chronic sinus disease.  The visualized orbits appear intact.  Chronic change noted.                                       CT Cervical Spine Without Contrast (Final result)  Result time 11/24/22 21:42:55      Final result by David Gonzales MD (11/24/22 21:42:55)                   Impression:      Chronic changes are noted, appearing similar to the prior examination, correlation for any specific level of symptomatology is needed.    On close evaluation of available imaging, there is no evidence for acute cervical spine fracture deformity.      Electronically signed by: David Gonzales  Date:    11/24/2022  Time:    21:42               Narrative:    EXAMINATION:  CT CERVICAL SPINE WITHOUT CONTRAST    CLINICAL HISTORY:  Neck trauma (Age >= 65y);    TECHNIQUE:  Low dose axial images, sagittal and coronal reformations were performed though the cervical spine.  Contrast was not administered.    COMPARISON:  CT examination of the cervical spine December 29, 2020    FINDINGS:  The osseous structures demonstrate chronic change, there is diminished mineralization and there is degenerative  change.  There is minimal grade 1 anterolisthesis of C3 with respect to C4, mild grade 1 anterolisthesis of C3 with respect to C4 and C4 with respect to C5 and C7 with respect to T1.  Chronic appearing endplate changes are noted, marginal osteophyte formation is noted.  Overall appearance of vertebral body height and alignment is stable compared to the prior examination, there is no evidence for high-grade spondylolisthesis, there is no evidence for high-grade or acute compression fracture deformity.  Facet arthropathy is noted in there is appearance of facet fusion on the right and to a lesser extent the left at C2-3.  There is no evidence for facet dislocation and no evidence for facet fracture.  The occipital condyles articulate appropriately with the superior articular facets of C1 at the craniocervical junction.    There is chronic change throughout the cervical spine appearing stable when compared to the prior examination without evidence for high-grade spinal canal stenosis or large focal disc protrusion.  Multilevel facet arthropathy in foraminal narrowing and stenosis noted appearing stable.    On close evaluation of available imaging, there is no evidence for acute cervical spine fracture deformity.    There is heterogeneity of the thyroid including small calcifications.  The visualized lung apices appear clear.                                       Medications   LIDOcaine (PF) 10 mg/ml (1%) injection 100 mg (100 mg Infiltration Given 11/24/22 2045)   acetaminophen tablet 650 mg (650 mg Oral Given 11/25/22 0442)   bacitracin zinc ointment 1 each (1 each Topical (Top) Given 11/25/22 0442)     Medical Decision Making:   History:   Old Medical Records: I decided to obtain old medical records.  Old Records Summarized: other records.       <> Summary of Records: Packet from NH  Clinical Tests:   Radiological Study: Ordered and Reviewed  ED Management:  89-year-old female presents after a ground level fall.   Forehead trauma.  Vitals with HTN.  PE as above.  Laceration repair as above.  CT head with very small SAH.  C spine without fracture.  Discussed with NS who evaluated at bedside.  Repeat CT head ordered.  Unchanged.  Will DC home.  Patient will f/up with NS in clinic.  Patient will return to ED for worsening symptoms, inability to eat/drink, fever greater than 100.4, or any other concerns. Did bedside teaching with return precautions.  All questions answered.  The patient acknowledges understanding.  Gave verbal discharge instructions.   Other:   I have discussed this case with another health care provider.       <> Summary of the Discussion: NS, ed eval                        Clinical Impression:   Final diagnoses:  [S80.01XA] Contusion of right knee  [W19.XXXA] Fall, initial encounter  [S01.81XA] Forehead laceration, initial encounter (Primary)  [I60.9] SAH (subarachnoid hemorrhage)      ED Disposition Condition    Discharge Stable          ED Prescriptions    None       Follow-up Information       Follow up With Specialties Details Why Contact Info    [facility] - Emergency Dept Emergency Medicine  Return to ED for worsening symptoms, inability to eat/drink, fever greater than 100.4, or any other concerns. [address] [facility] [id number] [phone]    Jenna Blair MD Neurosurgery, Pediatric Neurosurgery   [address] [facility]  Department of Neurosurgery - 7th Floor  [facility] [address]  [phone]            Level of Complexity:  High, level 5.      Luis Simmons MD  11/25/22 0513

## 2022-11-25 NOTE — DISCHARGE INSTRUCTIONS
Sutures out in 7 days.    Apply bacitracin twice a day until healed.    Our goal in the emergency department is to always give you outstanding care and exceptional service. You may receive a survey by mail or e-mail in the next week regarding your experience in our ED. We would greatly appreciate your completing and returning the survey. Your feedback provides us with a way to recognize our staff who give very good care and it helps us learn how to improve when your experience was below our aspiration of excellence.

## 2022-11-25 NOTE — ED NOTES
Bisi Bazan, a 89 y.o. female presents to the ED via EMS w/ complaint of bleeding head lac from fall a couple hours ago. EMS states pt fell when trying to stand from a wheelchair and hit her head on the ground. Reports HA. Family at bedside denies pt taking any blood thinners. Pt AAOx4    Triage note:  Chief Complaint   Patient presents with    Fall     Coming from [place]. Pt stood from wheelchair and fell from a standing position and struck her head. Laceration noted to forehead. Unwitnessed fall so unknown of LOC. - blood thinners.      Review of patient's allergies indicates:   Allergen Reactions    Aspirin     Felodipine     Morphine      Past Medical History:   Diagnosis Date    Arthritis     Hip fracture, right     History of breast surgery     Patient identifiers for Bisi Bazan checked and correct.    LOC: The patient is awake, alert and aware of environment with an appropriate affect, the patient is oriented x 4 and speaking appropriately.  APPEARANCE: Patient resting comfortably and in no acute distress, patient is clean and well groomed, patient's clothing is properly fastened.  SKIN: The skin is warm and dry, color consistent with ethnicity, patient has normal skin turgor and moist mucus membranes. +R sided forehead lac, bruising and swelling to R eye.  MUSCULOSKELETAL: Patient moving all extremities well, no obvious swelling or deformities noted.  RESPIRATORY: Airway is open and patent, respirations are spontaneous and even, patient has a normal effort and rate.  CARDIAC: Patient has a normal rate and rhythm, no periphreal edema noted, capillary refill < 3 seconds. Normal +2 pedal pulses present.  ABDOMEN: Soft and non tender to palpation, no distention noted. Patient denies any nausea, vomiting, diarrhea, or constipation.   NEUROLOGIC: Eyes open spontaneously, PERRL, behavior appropriate to situation, follows commands, facial expression symmetrical, bilateral hand  grasp equal and even, purposeful motor response noted, normal sensation in all extremities.

## 2022-11-25 NOTE — SUBJECTIVE & OBJECTIVE
(Not in a hospital admission)      Review of patient's allergies indicates:   Allergen Reactions    Aspirin     Felodipine     Morphine        Past Medical History:   Diagnosis Date    Arthritis     Hip fracture, right     History of breast surgery      Past Surgical History:   Procedure Laterality Date    BREAST SURGERY      HIP SURGERY      HYSTERECTOMY       Family History    None       Tobacco Use    Smoking status: Never    Smokeless tobacco: Never   Substance and Sexual Activity    Alcohol use: No    Drug use: No    Sexual activity: Not on file     Review of Systems  Objective:     Weight: 77.1 kg (170 lb)  Body mass index is 29.18 kg/m².  Vital Signs (Most Recent):  Temp: 98.9 °F (37.2 °C) (11/24/22 2019)  Pulse: 81 (11/24/22 2310)  Resp: 18 (11/24/22 2310)  BP: (!) 125/58 (11/24/22 2310)  SpO2: 98 % (11/24/22 2310)   Vital Signs (24h Range):  Temp:  [98.9 °F (37.2 °C)] 98.9 °F (37.2 °C)  Pulse:  [77-81] 81  Resp:  [18-20] 18  SpO2:  [97 %-98 %] 98 %  BP: (125-155)/(58-68) 125/58       Physical Exam  General: well developed, well nourished, no distress.   Head: normocephalic, right forehead laceration repaired with surrounding ecchymosis and edema, periorbital ecchymosis on the right   Neurologic: Alert and oriented. Thought content appropriate.  GCS: Motor: 6/Verbal: 5/Eyes: 4 GCS Total: 15  Mental Status: Awake, Alert, Oriented x3  Cranial nerves: face symmetric, tongue midline, CN II-XII grossly intact.   Eyes: pupils equal, round, reactive to light with accomodation, EOMI.   Sensory: intact to light touch throughout  Motor Strength:Moves all extremities spontaneously with good tone at least antigravity. No abnormal movements seen.   DTR's - 2 + and symmetric in UE and LE  Pronator Drift: no drift noted  Finger-to-nose: Intact bilaterally    Significant Labs:  No results for input(s): GLU, NA, K, CL, CO2, BUN, CREATININE, CALCIUM, MG in the last 48 hours.  No results for input(s): WBC, HGB, HCT, PLT in the  last 48 hours.  No results for input(s): LABPT, INR, APTT in the last 48 hours.  Microbiology Results (last 7 days)       ** No results found for the last 168 hours. **          All pertinent labs from the last 24 hours have been reviewed.    Significant Diagnostics:  CT: CT Head Without Contrast    Result Date: 11/24/2022  Small amount of subarachnoid hemorrhage on the left, as discussed above. Otherwise chronic appearing intracranial changes are noted. Extracranial frontal soft tissue injury is noted, there is no evidence for underlying calvarial fracture. Suspected soft tissue nodule or sebaceous cyst of the subcutaneous fat planes of the superior left paramidline posterior vertex region, as discussed above. Paranasal sinus findings as above. This report was flagged in Epic as abnormal. Findings were reported to Dr. Simmons in the ER at the time of dictation. Electronically signed by: David Gonzales Date:    11/24/2022 Time:    21:45

## 2022-11-25 NOTE — ASSESSMENT & PLAN NOTE
89 yo female, very hard of hearing, who presents s/p mechanical fall with head injury and tiny left frontal tSAH seen on CTH. Pt remains at her neuro baseline.    -No acute neurosurgical intervention  -Recommend 6h stability CTH  -q2 hour neuro checks  -All labs and diagnostics reviewed  -CBC, PT/INR, PTT  -SBP <160   -Na >135  -If repeat CTH stable, no follow up necessary. Continue to monitor clinically, notify NSGY immediately with any changes in neuro status  -Will review with attending staff Dr. Blair

## 2023-03-05 ENCOUNTER — HOSPITAL ENCOUNTER (INPATIENT)
Facility: HOSPITAL | Age: 88
LOS: 5 days | Discharge: HOME | DRG: 177 | End: 2023-03-10
Attending: EMERGENCY MEDICINE | Admitting: STUDENT IN AN ORGANIZED HEALTH CARE EDUCATION/TRAINING PROGRAM
Payer: MEDICARE

## 2023-03-05 DIAGNOSIS — R41.82 AMS (ALTERED MENTAL STATUS): ICD-10-CM

## 2023-03-05 DIAGNOSIS — M25.552 LEFT HIP PAIN: ICD-10-CM

## 2023-03-05 DIAGNOSIS — R41.82 ALTERED MENTAL STATUS: ICD-10-CM

## 2023-03-05 DIAGNOSIS — U07.1 COVID-19: Primary | ICD-10-CM

## 2023-03-05 PROBLEM — E87.1 HYPONATREMIA: Status: ACTIVE | Noted: 2023-03-05

## 2023-03-05 PROBLEM — E87.5 HYPERKALEMIA: Status: ACTIVE | Noted: 2023-03-05

## 2023-03-05 PROBLEM — G93.40 ACUTE ENCEPHALOPATHY: Status: ACTIVE | Noted: 2023-03-05

## 2023-03-05 LAB
ALBUMIN SERPL BCP-MCNC: 3.6 G/DL (ref 3.5–5.2)
ALP SERPL-CCNC: 92 U/L (ref 55–135)
ALT SERPL W/O P-5'-P-CCNC: 18 U/L (ref 10–44)
AMMONIA PLAS-SCNC: 31 UMOL/L (ref 10–50)
AMPHET+METHAMPHET UR QL: NEGATIVE
ANION GAP SERPL CALC-SCNC: 13 MMOL/L (ref 8–16)
ANION GAP SERPL CALC-SCNC: 7 MMOL/L (ref 8–16)
APTT BLDCRRT: 28.8 SEC (ref 21–32)
AST SERPL-CCNC: 28 U/L (ref 10–40)
BACTERIA #/AREA URNS AUTO: ABNORMAL /HPF
BARBITURATES UR QL SCN>200 NG/ML: NEGATIVE
BASOPHILS # BLD AUTO: 0.06 K/UL (ref 0–0.2)
BASOPHILS NFR BLD: 0.4 % (ref 0–1.9)
BENZODIAZ UR QL SCN>200 NG/ML: NEGATIVE
BILIRUB SERPL-MCNC: 0.5 MG/DL (ref 0.1–1)
BILIRUB UR QL STRIP: NEGATIVE
BNP SERPL-MCNC: 28 PG/ML (ref 0–99)
BUN SERPL-MCNC: 18 MG/DL (ref 10–30)
BUN SERPL-MCNC: 18 MG/DL (ref 10–30)
BZE UR QL SCN: NEGATIVE
CALCIUM SERPL-MCNC: 8.9 MG/DL (ref 8.7–10.5)
CALCIUM SERPL-MCNC: 9.4 MG/DL (ref 8.7–10.5)
CANNABINOIDS UR QL SCN: NEGATIVE
CHLORIDE SERPL-SCNC: 101 MMOL/L (ref 95–110)
CHLORIDE SERPL-SCNC: 95 MMOL/L (ref 95–110)
CK SERPL-CCNC: 76 U/L (ref 20–180)
CLARITY UR REFRACT.AUTO: CLEAR
CO2 SERPL-SCNC: 21 MMOL/L (ref 23–29)
CO2 SERPL-SCNC: 23 MMOL/L (ref 23–29)
COLOR UR AUTO: YELLOW
CREAT SERPL-MCNC: 0.9 MG/DL (ref 0.5–1.4)
CREAT SERPL-MCNC: 1 MG/DL (ref 0.5–1.4)
CREAT UR-MCNC: 54 MG/DL (ref 15–325)
DIFFERENTIAL METHOD: ABNORMAL
EOSINOPHIL # BLD AUTO: 0 K/UL (ref 0–0.5)
EOSINOPHIL NFR BLD: 0.1 % (ref 0–8)
ERYTHROCYTE [DISTWIDTH] IN BLOOD BY AUTOMATED COUNT: 13.8 % (ref 11.5–14.5)
ERYTHROCYTE [SEDIMENTATION RATE] IN BLOOD BY PHOTOMETRIC METHOD: 44 MM/HR (ref 0–36)
EST. GFR  (NO RACE VARIABLE): 53.2 ML/MIN/1.73 M^2
EST. GFR  (NO RACE VARIABLE): >60 ML/MIN/1.73 M^2
ETHANOL UR-MCNC: <10 MG/DL
GLUCOSE SERPL-MCNC: 133 MG/DL (ref 70–110)
GLUCOSE SERPL-MCNC: 159 MG/DL (ref 70–110)
GLUCOSE UR QL STRIP: NEGATIVE
HCT VFR BLD AUTO: 38.6 % (ref 37–48.5)
HCV AB SERPL QL IA: NORMAL
HGB BLD-MCNC: 13 G/DL (ref 12–16)
HGB UR QL STRIP: ABNORMAL
HIV 1+2 AB+HIV1 P24 AG SERPL QL IA: NORMAL
HYALINE CASTS UR QL AUTO: 2 /LPF
IMM GRANULOCYTES # BLD AUTO: 0.11 K/UL (ref 0–0.04)
IMM GRANULOCYTES NFR BLD AUTO: 0.7 % (ref 0–0.5)
INFLUENZA A, MOLECULAR: NOT DETECTED
INFLUENZA B, MOLECULAR: NOT DETECTED
INR PPP: 1 (ref 0.8–1.2)
KETONES UR QL STRIP: NEGATIVE
LACTATE SERPL-SCNC: 1.5 MMOL/L (ref 0.5–2.2)
LEUKOCYTE ESTERASE UR QL STRIP: NEGATIVE
LYMPHOCYTES # BLD AUTO: 1.5 K/UL (ref 1–4.8)
LYMPHOCYTES NFR BLD: 10.4 % (ref 18–48)
MAGNESIUM SERPL-MCNC: 1.8 MG/DL (ref 1.6–2.6)
MCH RBC QN AUTO: 32 PG (ref 27–31)
MCHC RBC AUTO-ENTMCNC: 33.7 G/DL (ref 32–36)
MCV RBC AUTO: 95 FL (ref 82–98)
METHADONE UR QL SCN>300 NG/ML: NEGATIVE
MICROSCOPIC COMMENT: ABNORMAL
MONOCYTES # BLD AUTO: 1 K/UL (ref 0.3–1)
MONOCYTES NFR BLD: 6.6 % (ref 4–15)
NEUTROPHILS # BLD AUTO: 12.2 K/UL (ref 1.8–7.7)
NEUTROPHILS NFR BLD: 81.8 % (ref 38–73)
NITRITE UR QL STRIP: NEGATIVE
NRBC BLD-RTO: 0 /100 WBC
OPIATES UR QL SCN: NEGATIVE
PCP UR QL SCN>25 NG/ML: NEGATIVE
PH UR STRIP: 5 [PH] (ref 5–8)
PLATELET # BLD AUTO: 217 K/UL (ref 150–450)
PMV BLD AUTO: 9.5 FL (ref 9.2–12.9)
POCT GLUCOSE: 170 MG/DL (ref 70–110)
POTASSIUM SERPL-SCNC: 4.2 MMOL/L (ref 3.5–5.1)
POTASSIUM SERPL-SCNC: 5.5 MMOL/L (ref 3.5–5.1)
PROCALCITONIN SERPL IA-MCNC: 0.07 NG/ML
PROT SERPL-MCNC: 7.5 G/DL (ref 6–8.4)
PROT UR QL STRIP: NEGATIVE
PROTHROMBIN TIME: 10.7 SEC (ref 9–12.5)
RBC # BLD AUTO: 4.06 M/UL (ref 4–5.4)
RBC #/AREA URNS AUTO: 1 /HPF (ref 0–4)
RSV AG BY MOLECULAR METHOD: NOT DETECTED
SARS-COV-2 RNA RESP QL NAA+PROBE: DETECTED
SODIUM SERPL-SCNC: 129 MMOL/L (ref 136–145)
SODIUM SERPL-SCNC: 131 MMOL/L (ref 136–145)
SP GR UR STRIP: 1.01 (ref 1–1.03)
SQUAMOUS #/AREA URNS AUTO: 1 /HPF
TOXICOLOGY INFORMATION: NORMAL
TROPONIN I SERPL DL<=0.01 NG/ML-MCNC: 0.01 NG/ML (ref 0–0.03)
TSH SERPL DL<=0.005 MIU/L-ACNC: 1.32 UIU/ML (ref 0.4–4)
URN SPEC COLLECT METH UR: ABNORMAL
VIT B12 SERPL-MCNC: 395 PG/ML (ref 210–950)
WBC # BLD AUTO: 14.86 K/UL (ref 3.9–12.7)
WBC #/AREA URNS AUTO: 1 /HPF (ref 0–5)

## 2023-03-05 PROCEDURE — 87040 BLOOD CULTURE FOR BACTERIA: CPT

## 2023-03-05 PROCEDURE — 82550 ASSAY OF CK (CPK): CPT

## 2023-03-05 PROCEDURE — 99285 EMERGENCY DEPT VISIT HI MDM: CPT | Mod: 25

## 2023-03-05 PROCEDURE — 81001 URINALYSIS AUTO W/SCOPE: CPT

## 2023-03-05 PROCEDURE — 84443 ASSAY THYROID STIM HORMONE: CPT

## 2023-03-05 PROCEDURE — 83735 ASSAY OF MAGNESIUM: CPT

## 2023-03-05 PROCEDURE — 84425 ASSAY OF VITAMIN B-1: CPT | Performed by: NURSE PRACTITIONER

## 2023-03-05 PROCEDURE — 80307 DRUG TEST PRSMV CHEM ANLYZR: CPT

## 2023-03-05 PROCEDURE — 85610 PROTHROMBIN TIME: CPT | Performed by: NURSE PRACTITIONER

## 2023-03-05 PROCEDURE — 86592 SYPHILIS TEST NON-TREP QUAL: CPT | Performed by: NURSE PRACTITIONER

## 2023-03-05 PROCEDURE — 85730 THROMBOPLASTIN TIME PARTIAL: CPT | Performed by: NURSE PRACTITIONER

## 2023-03-05 PROCEDURE — 25000003 PHARM REV CODE 250: Performed by: NURSE PRACTITIONER

## 2023-03-05 PROCEDURE — 82140 ASSAY OF AMMONIA: CPT | Performed by: NURSE PRACTITIONER

## 2023-03-05 PROCEDURE — 86803 HEPATITIS C AB TEST: CPT | Performed by: PHYSICIAN ASSISTANT

## 2023-03-05 PROCEDURE — 63600175 PHARM REV CODE 636 W HCPCS

## 2023-03-05 PROCEDURE — 82962 GLUCOSE BLOOD TEST: CPT

## 2023-03-05 PROCEDURE — 99497 PR ADVNCD CARE PLAN 30 MIN: ICD-10-PCS | Mod: 25,,, | Performed by: NURSE PRACTITIONER

## 2023-03-05 PROCEDURE — 85025 COMPLETE CBC W/AUTO DIFF WBC: CPT

## 2023-03-05 PROCEDURE — 80053 COMPREHEN METABOLIC PANEL: CPT

## 2023-03-05 PROCEDURE — 84145 PROCALCITONIN (PCT): CPT | Performed by: NURSE PRACTITIONER

## 2023-03-05 PROCEDURE — 84484 ASSAY OF TROPONIN QUANT: CPT | Performed by: NURSE PRACTITIONER

## 2023-03-05 PROCEDURE — 83605 ASSAY OF LACTIC ACID: CPT | Performed by: NURSE PRACTITIONER

## 2023-03-05 PROCEDURE — 0241U SARS-COV2 (COVID) WITH FLU/RSV BY PCR: CPT

## 2023-03-05 PROCEDURE — 99285 PR EMERGENCY DEPT VISIT,LEVEL V: ICD-10-PCS | Mod: CR,CS,, | Performed by: EMERGENCY MEDICINE

## 2023-03-05 PROCEDURE — 99497 ADVNCD CARE PLAN 30 MIN: CPT | Mod: 25,,, | Performed by: NURSE PRACTITIONER

## 2023-03-05 PROCEDURE — 99285 EMERGENCY DEPT VISIT HI MDM: CPT | Mod: CR,CS,, | Performed by: EMERGENCY MEDICINE

## 2023-03-05 PROCEDURE — 99223 1ST HOSP IP/OBS HIGH 75: CPT | Mod: CR,,, | Performed by: NURSE PRACTITIONER

## 2023-03-05 PROCEDURE — 93005 ELECTROCARDIOGRAM TRACING: CPT

## 2023-03-05 PROCEDURE — 99223 PR INITIAL HOSPITAL CARE,LEVL III: ICD-10-PCS | Mod: CR,,, | Performed by: NURSE PRACTITIONER

## 2023-03-05 PROCEDURE — 85652 RBC SED RATE AUTOMATED: CPT | Performed by: NURSE PRACTITIONER

## 2023-03-05 PROCEDURE — 25000003 PHARM REV CODE 250

## 2023-03-05 PROCEDURE — 82607 VITAMIN B-12: CPT | Performed by: NURSE PRACTITIONER

## 2023-03-05 PROCEDURE — 83880 ASSAY OF NATRIURETIC PEPTIDE: CPT

## 2023-03-05 PROCEDURE — 93010 EKG 12-LEAD: ICD-10-PCS | Mod: ,,, | Performed by: INTERNAL MEDICINE

## 2023-03-05 PROCEDURE — 80048 BASIC METABOLIC PNL TOTAL CA: CPT | Mod: XB | Performed by: NURSE PRACTITIONER

## 2023-03-05 PROCEDURE — 87389 HIV-1 AG W/HIV-1&-2 AB AG IA: CPT | Performed by: PHYSICIAN ASSISTANT

## 2023-03-05 PROCEDURE — 93010 ELECTROCARDIOGRAM REPORT: CPT | Mod: ,,, | Performed by: INTERNAL MEDICINE

## 2023-03-05 PROCEDURE — 12000002 HC ACUTE/MED SURGE SEMI-PRIVATE ROOM

## 2023-03-05 PROCEDURE — 63600175 PHARM REV CODE 636 W HCPCS: Mod: JZ,TB | Performed by: NURSE PRACTITIONER

## 2023-03-05 PROCEDURE — 27000207 HC ISOLATION

## 2023-03-05 RX ORDER — CYANOCOBALAMIN (VITAMIN B-12) 250 MCG
500 TABLET ORAL DAILY
Status: DISCONTINUED | OUTPATIENT
Start: 2023-03-06 | End: 2023-03-10 | Stop reason: HOSPADM

## 2023-03-05 RX ORDER — POTASSIUM CHLORIDE 1500 MG/1
20 TABLET, EXTENDED RELEASE ORAL 2 TIMES DAILY
COMMUNITY
Start: 2022-05-16

## 2023-03-05 RX ORDER — ALBUTEROL SULFATE 90 UG/1
2 AEROSOL, METERED RESPIRATORY (INHALATION) EVERY 6 HOURS PRN
Status: DISCONTINUED | OUTPATIENT
Start: 2023-03-05 | End: 2023-03-10 | Stop reason: HOSPADM

## 2023-03-05 RX ORDER — BUMETANIDE 2 MG/1
1 TABLET ORAL DAILY
Status: ON HOLD | COMMUNITY
Start: 2023-02-17 | End: 2023-03-09 | Stop reason: SDUPTHER

## 2023-03-05 RX ORDER — TALC
6 POWDER (GRAM) TOPICAL NIGHTLY PRN
Status: DISCONTINUED | OUTPATIENT
Start: 2023-03-05 | End: 2023-03-10 | Stop reason: HOSPADM

## 2023-03-05 RX ORDER — ACETAMINOPHEN 325 MG/1
650 TABLET ORAL EVERY 4 HOURS PRN
Status: DISCONTINUED | OUTPATIENT
Start: 2023-03-05 | End: 2023-03-06

## 2023-03-05 RX ORDER — ONDANSETRON 2 MG/ML
4 INJECTION INTRAMUSCULAR; INTRAVENOUS EVERY 8 HOURS PRN
Status: DISCONTINUED | OUTPATIENT
Start: 2023-03-05 | End: 2023-03-10 | Stop reason: HOSPADM

## 2023-03-05 RX ORDER — SODIUM CHLORIDE 0.9 % (FLUSH) 0.9 %
10 SYRINGE (ML) INJECTION
Status: DISCONTINUED | OUTPATIENT
Start: 2023-03-05 | End: 2023-03-10 | Stop reason: HOSPADM

## 2023-03-05 RX ORDER — POLYETHYLENE GLYCOL 3350 17 G/17G
17 POWDER, FOR SOLUTION ORAL
COMMUNITY

## 2023-03-05 RX ORDER — LIDOCAINE 50 MG/G
3 PATCH TOPICAL
Status: DISCONTINUED | OUTPATIENT
Start: 2023-03-05 | End: 2023-03-10 | Stop reason: HOSPADM

## 2023-03-05 RX ORDER — ENOXAPARIN SODIUM 100 MG/ML
40 INJECTION SUBCUTANEOUS
Status: DISCONTINUED | OUTPATIENT
Start: 2023-03-06 | End: 2023-03-10 | Stop reason: HOSPADM

## 2023-03-05 RX ORDER — DEXTROSE 40 %
15 GEL (GRAM) ORAL
Status: DISCONTINUED | OUTPATIENT
Start: 2023-03-05 | End: 2023-03-09

## 2023-03-05 RX ORDER — GLUCAGON 1 MG
1 KIT INJECTION
Status: DISCONTINUED | OUTPATIENT
Start: 2023-03-05 | End: 2023-03-09

## 2023-03-05 RX ORDER — POLYETHYLENE GLYCOL 3350 17 G/17G
17 POWDER, FOR SOLUTION ORAL 2 TIMES DAILY PRN
Status: DISCONTINUED | OUTPATIENT
Start: 2023-03-05 | End: 2023-03-10 | Stop reason: HOSPADM

## 2023-03-05 RX ORDER — METHOCARBAMOL 500 MG/1
500 TABLET, FILM COATED ORAL ONCE
Status: COMPLETED | OUTPATIENT
Start: 2023-03-05 | End: 2023-03-05

## 2023-03-05 RX ORDER — ASCORBIC ACID 500 MG
500 TABLET ORAL 2 TIMES DAILY
Status: DISCONTINUED | OUTPATIENT
Start: 2023-03-05 | End: 2023-03-10

## 2023-03-05 RX ORDER — DEXTROSE 40 %
30 GEL (GRAM) ORAL
Status: DISCONTINUED | OUTPATIENT
Start: 2023-03-05 | End: 2023-03-09

## 2023-03-05 RX ORDER — LIDOCAINE 50 MG/G
2 PATCH TOPICAL
COMMUNITY
Start: 2023-02-03

## 2023-03-05 RX ORDER — ACETAMINOPHEN 500 MG
1000 TABLET ORAL
Status: COMPLETED | OUTPATIENT
Start: 2023-03-05 | End: 2023-03-05

## 2023-03-05 RX ORDER — DROPERIDOL 2.5 MG/ML
0.62 INJECTION, SOLUTION INTRAMUSCULAR; INTRAVENOUS
Status: COMPLETED | OUTPATIENT
Start: 2023-03-05 | End: 2023-03-05

## 2023-03-05 RX ADMIN — ACETAMINOPHEN 1000 MG: 500 TABLET ORAL at 03:03

## 2023-03-05 RX ADMIN — SODIUM CHLORIDE 1000 ML: 9 INJECTION, SOLUTION INTRAVENOUS at 03:03

## 2023-03-05 RX ADMIN — DROPERIDOL 0.62 MG: 2.5 INJECTION, SOLUTION INTRAMUSCULAR; INTRAVENOUS at 03:03

## 2023-03-05 RX ADMIN — LIDOCAINE 3 PATCH: 50 PATCH CUTANEOUS at 10:03

## 2023-03-05 RX ADMIN — METHOCARBAMOL 500 MG: 500 TABLET ORAL at 10:03

## 2023-03-05 RX ADMIN — REMDESIVIR 200 MG: 100 INJECTION, POWDER, LYOPHILIZED, FOR SOLUTION INTRAVENOUS at 09:03

## 2023-03-05 RX ADMIN — Medication 500 MG: at 09:03

## 2023-03-05 NOTE — PROVIDER PROGRESS NOTES - EMERGENCY DEPT.
"ED Resident HAND-OFF NOTE:  3:50 PM 3/5/2023  Bisi Bazan is a 89 y.o. female who presented to the ED on 3/5/2023 and has been managed by Dr. Shearer and Dr. Paige, who reports patient C/O new onset AMS. I assumed care of patient from off-going ED physician team at 3:50 PM pending CT head, pelvis XR and labs.    On my evaluation, Bisi Bazan appears well, hemodynamically stable and in NAD. Thus far, Bisi Bazan has received:  Medications   sodium chloride 0.9% bolus 1,000 mL 1,000 mL (0 mLs Intravenous Stopped 3/5/23 1814)   acetaminophen tablet 1,000 mg (1,000 mg Oral Given 3/5/23 1507)   droperidoL injection 0.625 mg (0.625 mg Intravenous Given 3/5/23 1502)       On my exam, I appreciate:  BP (!) 122/56 (BP Location: Left arm, Patient Position: Lying)   Pulse 90   Temp 98.4 °F (36.9 °C) (Oral)   Resp 18   Ht 5' 3" (1.6 m)   Wt 81.6 kg (180 lb)   SpO2 95%   BMI 31.89 kg/m²     - Patient tested positive for COVID-19 and is also found to be hyponatremic and hyperkalemic.  Daughter states that the patient became acutely confused this morning which is not normal per baseline.  She will be admitted to hospital medicine as an inpatient for acute altered mental status in the setting of hyponatremia, hyperkalemia and COVID infection.  ______________________  Festus Wilson MD  Emergency Medicine Resident  3:50 PM 3/5/2023       "

## 2023-03-05 NOTE — ED NOTES
Bed: [facility]  Expected date:   Expected time:   Means of arrival:   Comments:

## 2023-03-05 NOTE — ED TRIAGE NOTES
"89 yo F presents to the ED per EMS from assisted living facility. Pt c/o "pain all over," facility concerned pt was "listing to the right", had a tremor in the R hand, and was not her neuro baseline. Per family at the baseline, pt seems more confused than her baseline. Pt is oriented to self and place. Per family pt speech seems "slightly slurred", speaking clearly intermittently.  Abdomen noted distended, L leg 2+ pitting edema, R lower leg reddened and cold to touch, distal pulses palpated.   "

## 2023-03-05 NOTE — ED PROVIDER NOTES
"Encounter Date: 3/5/2023       History     Chief Complaint   Patient presents with    Pain     Complaining of generalized body pain and nausea, hx of dementia. Received 4 of zofran     89-year-old female who resides in nursing home, with past medical history of DVT and previous bilateral hip arthroplasties.  Patient now presents with daughter at bedside who reports that patient now has new onset 1 day history of altered mental status and complaint of diffuse pain.  Patient's daughter reports that yesterday patient was normal, and she did not have any concerning symptoms including cough, shortness of breath, complaining of chest pain, dizziness,  headache, dysuria or fever.  Patient was brought to ED by EMS who reports that patient was stable on transfer Ed she received 4 mg of IV Zofran.  Further history from patient's limited given patient's altered mental status and she just keeps saying "help me".    The history is provided by a relative and the EMS personnel.   Review of patient's allergies indicates:   Allergen Reactions    Aspirin     Felodipine     Morphine      Past Medical History:   Diagnosis Date    Arthritis     Hip fracture, right     History of breast surgery      Past Surgical History:   Procedure Laterality Date    BREAST SURGERY      HIP SURGERY      HYSTERECTOMY       No family history on file.  Social History     Tobacco Use    Smoking status: Never    Smokeless tobacco: Never   Substance Use Topics    Alcohol use: No    Drug use: No     Review of Systems   Reason unable to perform ROS: Patient has altered mental status.     Physical Exam     Initial Vitals [03/05/23 1348]   BP Pulse Resp Temp SpO2   (!) 158/85 (!) 112 20 99.3 °F (37.4 °C) 96 %      MAP       --         Physical Exam    Nursing note and vitals reviewed.    Gen:  Patient awake and alert but not oriented to time person or place, well nourished, appears stated age, no pallor, no jaundice, appears mildly dehydrated with dry mucous " membranes.  Eye: EOMI, no scleral icterus, no periorbital edema or ecchymosis  Head: normocephalic, atraumatic, no lesions, scalp appears normal  ENT: neck supple, no stridor, no masses, no drooling or voice changes  CVS: All distal pulses intact with normal rate and rhythm, no JVD, normal S1/S2, no murmur  Pulm: Normal breath sounds, no wheezes, rales or rhonchi, no increased work of breathing  Abd: soft, nontender, mildly distended without guarding, no organomegaly, no CVAT  Ext:  Tenderness to palpation over the left hip. Red discoloration of bilateral lower extremities from a calves extending to ankles with tenderness to palpation over the left calf.   Neuro: GCS14, moving all extremities, slight droop viewed of right mouth corner without other associated facial weakness.  All other cranial nerves intact.  Power tone and sensation intact globally in bilaterally.  Unable to assess for coordination given patient's altered mental status  Psych:  Unable to assess as patient has altered mental status and only screams help me  ED Course   Procedures  Labs Reviewed   CBC W/ AUTO DIFFERENTIAL - Abnormal; Notable for the following components:       Result Value    WBC 14.86 (*)     MCH 32.0 (*)     Immature Granulocytes 0.7 (*)     Gran # (ANC) 12.2 (*)     Immature Grans (Abs) 0.11 (*)     Gran % 81.8 (*)     Lymph % 10.4 (*)     All other components within normal limits   COMPREHENSIVE METABOLIC PANEL - Abnormal; Notable for the following components:    Sodium 129 (*)     Potassium 5.5 (*)     CO2 21 (*)     Glucose 159 (*)     eGFR 53.2 (*)     All other components within normal limits   URINALYSIS, REFLEX TO URINE CULTURE - Abnormal; Notable for the following components:    Occult Blood UA 1+ (*)     All other components within normal limits    Narrative:     Specimen Source->Urine   SARS-COV2 (COVID) WITH FLU/RSV BY PCR - Abnormal; Notable for the following components:    SARS-CoV2 (COVID-19) Qualitative PCR  Detected (*)     All other components within normal limits   URINALYSIS MICROSCOPIC - Abnormal; Notable for the following components:    Hyaline Casts, UA 2 (*)     All other components within normal limits    Narrative:     Specimen Source->Urine   SEDIMENTATION RATE - Abnormal; Notable for the following components:    Sed Rate 44 (*)     All other components within normal limits    Narrative:     Release to patient->Immediate   POCT GLUCOSE - Abnormal; Notable for the following components:    POCT Glucose 170 (*)     All other components within normal limits   CULTURE, BLOOD   CULTURE, BLOOD   HIV 1 / 2 ANTIBODY    Narrative:     Release to patient->Immediate   HEPATITIS C ANTIBODY    Narrative:     Release to patient->Immediate   TSH   CK   B-TYPE NATRIURETIC PEPTIDE   B-TYPE NATRIURETIC PEPTIDE    Narrative:     ADD ON BNP PER DR JAYNA RUBI/ORDER# [id number] @ 19:30   MAGNESIUM   TOXICOLOGY SCREEN, URINE, RANDOM (COMPLIANCE)   MAGNESIUM    Narrative:     ADD ON MAGNESIUM PER BENJAMIN NGO NP/ORDER# [id number] @ 20:09    ADD ON BNP PER DR JAYNA RUBI/ORDER# [id number] @ 19:30   TOXICOLOGY SCREEN, URINE, RANDOM (COMPLIANCE)    Narrative:     Specimen Source->Urine    PER BENJAMIN NGO REQUEST ADD ON TOXICOLOGY SCREEN (ORDER   [id number]) @20:41 ON MARCH 5 2023   SODIUM, URINE, RANDOM   OSMOLALITY, URINE RANDOM   PROTIME-INR   APTT   TROPONIN I   LACTIC ACID, PLASMA   PROCALCITONIN   AMMONIA   RPR   VITAMIN B1   VITAMIN B12   BASIC METABOLIC PANEL          Imaging Results              X-Ray Chest AP Portable (Final result)  Result time 03/05/23 16:53:41      Final result by Lillie Flower MD (03/05/23 16:53:41)                   Impression:      No acute pulmonary pathology identified.  Other findings are as above.      Electronically signed by: Lillie Flower  Date:    03/05/2023  Time:    16:53               Narrative:    EXAMINATION:  XR CHEST AP PORTABLE    CLINICAL HISTORY:  Altered mental status,  unspecified    TECHNIQUE:  Single frontal view of the chest was performed.    COMPARISON:  12/29/2020    FINDINGS:  There is severe degenerative changes of both shoulders.  The lungs appear clear.  The heart size appears normal.  There is mild calcification of the aortic knob consistent with atherosclerotic stigmata.    The bone density appears diminished.                                       X-Ray Pelvis Routine AP (Final result)  Result time 03/05/23 16:48:05      Final result by Kush De La Garza MD (03/05/23 16:48:05)                   Impression:      1. Significantly limited examination given artifact from pannus, please see above.      Electronically signed by: Kush De La Garza MD  Date:    03/05/2023  Time:    16:48               Narrative:    EXAMINATION:  XR PELVIS ROUTINE AP    CLINICAL HISTORY:  hip pain;    TECHNIQUE:  AP view of the pelvis was performed.    COMPARISON:  11/04/2013    FINDINGS:  Single-view pelvis.    Please note, there is extensive artifact from overlying pannus, significantly limiting evaluation of the pelvis, particularly the sacrum.  Left sacral fracture cannot be excluded on the basis of this exam.    The pubic symphysis appears intact.  There are bilateral hip prostheses, no convincing dislocation or findings to suggest loosening.  There may be remote fracture of the right inferior pubic ramus.                                       CT Head Without Contrast (Final result)  Result time 03/05/23 16:41:58      Final result by Addi Guillermo DO (03/05/23 16:41:58)                   Impression:      No acute intracranial findings as detailed above specifically without evidence for acute intracranial hemorrhage or sulcal effacement to suggest large territory recent infarction.    Further evaluation as warranted clinically.      Electronically signed by: Addi Guillermo DO  Date:    03/05/2023  Time:    16:41               Narrative:    EXAMINATION:  CT HEAD WITHOUT CONTRAST    CLINICAL  HISTORY:  Mental status change, unknown cause;    TECHNIQUE:  Multiple sequential 5 mm axial images of the head without contrast.  Coronal and sagittal reformatted imaging from the axial acquisition.    COMPARISON:  11/25/2022    FINDINGS:  Mild cerebral volume loss.  Ventricles stable without hydrocephalus.  Ill-defined decreased attenuation supratentorial white matter while nonspecific concerning for sequela of chronic ischemic change.  Continued partially empty sella.  Allowing for scatter artifacts there is no definite acute intracranial hemorrhage or sulcal effacement to suggest large territory recent infarction.  Visualized paranasal sinuses and mastoid air cells are clear.  Continued mixed attenuation pedunculated lesion overlying the left posterior parietal/occipital calvarium suggestive for partially calcified sebaceous cyst.                                       Medications   sodium chloride 0.9% flush 10 mL (has no administration in time range)   remdesivir 200 mg in sodium chloride 0.9% 250 mL infusion (has no administration in time range)   remdesivir 100 mg in sodium chloride 0.9% 250 mL infusion (has no administration in time range)   glucagon (human recombinant) injection 1 mg (has no administration in time range)   albuterol inhaler 2 puff (has no administration in time range)   ascorbic acid (vitamin C) tablet 500 mg (500 mg Oral Given 3/5/23 2122)   multivitamin tablet (has no administration in time range)   dextrose 10% bolus 125 mL 125 mL (has no administration in time range)   dextrose 10% bolus 250 mL 250 mL (has no administration in time range)   dextrose 40 % gel 15,000 mg (has no administration in time range)   dextrose 40 % gel 30,000 mg (has no administration in time range)   enoxaparin injection 40 mg (has no administration in time range)   acetaminophen tablet 650 mg (has no administration in time range)   ondansetron injection 4 mg (has no administration in time range)   polyethylene  "glycol packet 17 g (has no administration in time range)   melatonin tablet 6 mg (has no administration in time range)   sodium chloride 0.9% bolus 1,000 mL 1,000 mL (0 mLs Intravenous Stopped 3/5/23 1814)   acetaminophen tablet 1,000 mg (1,000 mg Oral Given 3/5/23 1507)   droperidoL injection 0.625 mg (0.625 mg Intravenous Given 3/5/23 1502)     Medical Decision Making:   History:   Old Medical Records: I decided to obtain old medical records.  Initial Assessment:   89-year-old female who resides in nursing home, with past medical history of DVT and previous bilateral hip arthroplasties.  Patient now presents with daughter at bedside who reports that patient now has new onset 1 day history of altered mental status and complaint of diffuse pain. Patient is able to speak, breathing spontaneously, hemodynamically stable, oriented, moving all 4 limbs spontaneously.  Examination is consistent with drooping of the right corner of mouth, and tenderness to palpation over left hip and left calf.    Differential Diagnosis:   Considered CVA, intracranial bleed, infection including UTI/ pneumonia/ meningitis/viral infection/septic arthritis of arthroplasty, electrolyte abnormalities, DVT, falls, thyroid imbalance  Clinical Tests:   Lab Tests: Ordered  Radiological Study: Ordered  Medical Tests: Ordered  Sepsis Perfusion Assessment: "I attest a sepsis perfusion exam was performed within 6 hours of sepsis, severe sepsis, or septic shock presentation, following fluid resuscitation."  ED Management:  Patient presented to emergency department with altered mental status for 1 day and unable to provide adequate history or review of systems.  Patient's vital signs were stable upon arrival.  Examination was consistent with tenderness to palpation over left hip and left calf.  Given grossly normal examination in setting of inadequate history decided to obtain broad approach to workup including CT head, CBC, CMP, TSH, chest x-ray, UA, " "pelvic x-ray, EKG, and chest x-ray.  I started to give patient 1 L of normal saline and 0.625 droperidol for nausea and to aid with her agitation.  Patient was signed out to Dr. Sepulveda pending all labs and imaging.  Patient remained stable while in the emergency department.          Attending Attestation:   Physician Attestation Statement for Resident:  As the supervising MD   Physician Attestation Statement: I have personally seen and examined this patient.   I agree with the above history.  -:   As the supervising MD I agree with the above PE.     As the supervising MD I agree with the above treatment, course, plan, and disposition.   -: AMS, complaints of pain, constantly stating "I need help" but very difficult to get more details.  Daughter states this is far worse than baseline MS.  Labs show covid, hyponatremia, unclear source of pain.  Discussed with hospital medicine for hospitalization.                   ED Course as of 03/05/23 2123   [place] Mar 05, 2023   1910 SARS-CoV2 (COVID-19) Qualitative PCR(!): Detected [NN]   1911 Sodium(!): 129 [NN]   1911 Potassium(!): 5.5 [NN]   2012 EKG with sinus rhythm, rate 77, no STEMI. [NN]      ED Course User Index  [NN] Alida Fowler MD                 Clinical Impression:   Final diagnoses:  [R41.82] Altered mental status  [M25.552] Left hip pain  [R41.82] AMS (altered mental status)  [U07.1] COVID-19 (Primary)        ED Disposition Condition    Admit                 Tim Shearer MD  Resident  03/05/23 1735       Silvino Paige MD  03/05/23 2125    "

## 2023-03-06 LAB
ALBUMIN SERPL BCP-MCNC: 3.4 G/DL (ref 3.5–5.2)
ALP SERPL-CCNC: 84 U/L (ref 55–135)
ALT SERPL W/O P-5'-P-CCNC: 15 U/L (ref 10–44)
ANION GAP SERPL CALC-SCNC: 8 MMOL/L (ref 8–16)
AST SERPL-CCNC: 18 U/L (ref 10–40)
BASOPHILS # BLD AUTO: 0.03 K/UL (ref 0–0.2)
BASOPHILS NFR BLD: 0.3 % (ref 0–1.9)
BILIRUB SERPL-MCNC: 0.5 MG/DL (ref 0.1–1)
BUN SERPL-MCNC: 16 MG/DL (ref 10–30)
CALCIUM SERPL-MCNC: 9.4 MG/DL (ref 8.7–10.5)
CHLORIDE SERPL-SCNC: 101 MMOL/L (ref 95–110)
CO2 SERPL-SCNC: 24 MMOL/L (ref 23–29)
CREAT SERPL-MCNC: 0.9 MG/DL (ref 0.5–1.4)
D DIMER PPP IA.FEU-MCNC: 3.52 MG/L FEU
DIFFERENTIAL METHOD: ABNORMAL
EOSINOPHIL # BLD AUTO: 0.1 K/UL (ref 0–0.5)
EOSINOPHIL NFR BLD: 0.6 % (ref 0–8)
ERYTHROCYTE [DISTWIDTH] IN BLOOD BY AUTOMATED COUNT: 14.1 % (ref 11.5–14.5)
EST. GFR  (NO RACE VARIABLE): >60 ML/MIN/1.73 M^2
ESTIMATED AVG GLUCOSE: 137 MG/DL (ref 68–131)
FERRITIN SERPL-MCNC: 683 NG/ML (ref 20–300)
GLUCOSE SERPL-MCNC: 143 MG/DL (ref 70–110)
HBA1C MFR BLD: 6.4 % (ref 4–5.6)
HCT VFR BLD AUTO: 37.6 % (ref 37–48.5)
HGB BLD-MCNC: 12.2 G/DL (ref 12–16)
IMM GRANULOCYTES # BLD AUTO: 0.21 K/UL (ref 0–0.04)
IMM GRANULOCYTES NFR BLD AUTO: 1.9 % (ref 0–0.5)
LACTATE SERPL-SCNC: 1.7 MMOL/L (ref 0.5–2.2)
LDH SERPL L TO P-CCNC: 201 U/L (ref 110–260)
LYMPHOCYTES # BLD AUTO: 1.5 K/UL (ref 1–4.8)
LYMPHOCYTES NFR BLD: 13.7 % (ref 18–48)
MCH RBC QN AUTO: 31.4 PG (ref 27–31)
MCHC RBC AUTO-ENTMCNC: 32.4 G/DL (ref 32–36)
MCV RBC AUTO: 97 FL (ref 82–98)
MONOCYTES # BLD AUTO: 1.2 K/UL (ref 0.3–1)
MONOCYTES NFR BLD: 10.9 % (ref 4–15)
NEUTROPHILS # BLD AUTO: 7.9 K/UL (ref 1.8–7.7)
NEUTROPHILS NFR BLD: 72.6 % (ref 38–73)
NRBC BLD-RTO: 0 /100 WBC
PLATELET # BLD AUTO: 193 K/UL (ref 150–450)
PMV BLD AUTO: 9.5 FL (ref 9.2–12.9)
POTASSIUM SERPL-SCNC: 4.5 MMOL/L (ref 3.5–5.1)
PROT SERPL-MCNC: 6.8 G/DL (ref 6–8.4)
RBC # BLD AUTO: 3.88 M/UL (ref 4–5.4)
RPR SER QL: NORMAL
SODIUM SERPL-SCNC: 133 MMOL/L (ref 136–145)
WBC # BLD AUTO: 10.92 K/UL (ref 3.9–12.7)

## 2023-03-06 PROCEDURE — 27000221 HC OXYGEN, UP TO 24 HOURS

## 2023-03-06 PROCEDURE — 94761 N-INVAS EAR/PLS OXIMETRY MLT: CPT

## 2023-03-06 PROCEDURE — 36415 COLL VENOUS BLD VENIPUNCTURE: CPT | Performed by: INTERNAL MEDICINE

## 2023-03-06 PROCEDURE — 83605 ASSAY OF LACTIC ACID: CPT | Mod: 91 | Performed by: INTERNAL MEDICINE

## 2023-03-06 PROCEDURE — 85025 COMPLETE CBC W/AUTO DIFF WBC: CPT | Performed by: NURSE PRACTITIONER

## 2023-03-06 PROCEDURE — 36415 COLL VENOUS BLD VENIPUNCTURE: CPT | Performed by: NURSE PRACTITIONER

## 2023-03-06 PROCEDURE — 80053 COMPREHEN METABOLIC PANEL: CPT | Performed by: NURSE PRACTITIONER

## 2023-03-06 PROCEDURE — 99232 SBSQ HOSP IP/OBS MODERATE 35: CPT | Mod: ,,, | Performed by: INTERNAL MEDICINE

## 2023-03-06 PROCEDURE — 20600001 HC STEP DOWN PRIVATE ROOM

## 2023-03-06 PROCEDURE — 63600175 PHARM REV CODE 636 W HCPCS: Performed by: NURSE PRACTITIONER

## 2023-03-06 PROCEDURE — 25000242 PHARM REV CODE 250 ALT 637 W/ HCPCS: Performed by: INTERNAL MEDICINE

## 2023-03-06 PROCEDURE — 27000207 HC ISOLATION

## 2023-03-06 PROCEDURE — 83036 HEMOGLOBIN GLYCOSYLATED A1C: CPT | Performed by: NURSE PRACTITIONER

## 2023-03-06 PROCEDURE — 25000003 PHARM REV CODE 250: Performed by: NURSE PRACTITIONER

## 2023-03-06 PROCEDURE — 83615 LACTATE (LD) (LDH) ENZYME: CPT | Performed by: NURSE PRACTITIONER

## 2023-03-06 PROCEDURE — 94640 AIRWAY INHALATION TREATMENT: CPT

## 2023-03-06 PROCEDURE — 85379 FIBRIN DEGRADATION QUANT: CPT | Performed by: NURSE PRACTITIONER

## 2023-03-06 PROCEDURE — 82728 ASSAY OF FERRITIN: CPT | Performed by: NURSE PRACTITIONER

## 2023-03-06 PROCEDURE — 99232 PR SUBSEQUENT HOSPITAL CARE,LEVL II: ICD-10-PCS | Mod: ,,, | Performed by: INTERNAL MEDICINE

## 2023-03-06 RX ORDER — OLANZAPINE 10 MG/2ML
2.5 INJECTION, POWDER, FOR SOLUTION INTRAMUSCULAR ONCE AS NEEDED
Status: COMPLETED | OUTPATIENT
Start: 2023-03-06 | End: 2023-03-07

## 2023-03-06 RX ORDER — MAGNESIUM SULFATE 1 G/100ML
1 INJECTION INTRAVENOUS ONCE
Status: COMPLETED | OUTPATIENT
Start: 2023-03-06 | End: 2023-03-06

## 2023-03-06 RX ORDER — OXYBUTYNIN CHLORIDE 5 MG/1
10 TABLET, EXTENDED RELEASE ORAL DAILY
Status: DISCONTINUED | OUTPATIENT
Start: 2023-03-06 | End: 2023-03-09

## 2023-03-06 RX ORDER — VERAPAMIL HYDROCHLORIDE 120 MG/1
120 TABLET, FILM COATED, EXTENDED RELEASE ORAL DAILY
Status: DISCONTINUED | OUTPATIENT
Start: 2023-03-06 | End: 2023-03-09

## 2023-03-06 RX ORDER — ACETAMINOPHEN 650 MG/1
650 SUPPOSITORY RECTAL EVERY 6 HOURS PRN
Status: DISCONTINUED | OUTPATIENT
Start: 2023-03-06 | End: 2023-03-10 | Stop reason: HOSPADM

## 2023-03-06 RX ORDER — METHOCARBAMOL 500 MG/1
500 TABLET, FILM COATED ORAL 4 TIMES DAILY PRN
Status: DISCONTINUED | OUTPATIENT
Start: 2023-03-06 | End: 2023-03-10 | Stop reason: HOSPADM

## 2023-03-06 RX ORDER — ALBUTEROL SULFATE 2.5 MG/.5ML
2.5 SOLUTION RESPIRATORY (INHALATION) EVERY 4 HOURS PRN
Status: DISCONTINUED | OUTPATIENT
Start: 2023-03-06 | End: 2023-03-09

## 2023-03-06 RX ADMIN — OXYBUTYNIN CHLORIDE 10 MG: 5 TABLET, EXTENDED RELEASE ORAL at 09:03

## 2023-03-06 RX ADMIN — ENOXAPARIN SODIUM 40 MG: 40 INJECTION SUBCUTANEOUS at 05:03

## 2023-03-06 RX ADMIN — THERA TABS 1 TABLET: TAB at 09:03

## 2023-03-06 RX ADMIN — VERAPAMIL HYDROCHLORIDE 120 MG: 120 TABLET, FILM COATED, EXTENDED RELEASE ORAL at 10:03

## 2023-03-06 RX ADMIN — REMDESIVIR 100 MG: 100 INJECTION, POWDER, LYOPHILIZED, FOR SOLUTION INTRAVENOUS at 09:03

## 2023-03-06 RX ADMIN — ALBUTEROL SULFATE 2.5 MG: 2.5 SOLUTION RESPIRATORY (INHALATION) at 02:03

## 2023-03-06 RX ADMIN — MAGNESIUM SULFATE HEPTAHYDRATE 1 G: 500 INJECTION, SOLUTION INTRAMUSCULAR; INTRAVENOUS at 02:03

## 2023-03-06 RX ADMIN — Medication 500 MG: at 09:03

## 2023-03-06 RX ADMIN — Medication 500 MCG: at 09:03

## 2023-03-06 NOTE — SUBJECTIVE & OBJECTIVE
Past Medical History:   Diagnosis Date    Arthritis     Hip fracture, right     History of breast surgery        Past Surgical History:   Procedure Laterality Date    BREAST SURGERY      HIP SURGERY      HYSTERECTOMY         Review of patient's allergies indicates:   Allergen Reactions    Aspirin     Felodipine     Morphine        No current facility-administered medications on file prior to encounter.     Current Outpatient Medications on File Prior to Encounter   Medication Sig    potassium chloride (K-TAB) 20 mEq Take 20 mEq by mouth 2 (two) times a day.    acetaminophen (TYLENOL) 325 MG tablet Take 2 tablets (650 mg total) by mouth every 6 (six) hours as needed for Pain. Do NOT exceed 3000 mg in a 24 hour time period and do not take with other medications containing acetaminophen    bumetanide (BUMEX) 2 MG tablet Take 1 mg by mouth once daily. For fluid    busPIRone (BUSPAR) 10 MG tablet Take 10 mg by mouth 3 (three) times daily.    lactulose (CHRONULAC) 10 gram/15 mL solution Take 20 g by mouth daily as needed.    LIDOcaine (LIDODERM) 5 % 2 patches. Apply to both knees once a day as needed for pain ; remove per schedule    mineral oil-hydrophil petrolat Oint Apply topically as needed. Apply to topically to the perineum once a day for yeast; apply with every change    multivitamin (THERAGRAN) tablet Take 1 tablet by mouth once daily.    MYRBETRIQ 50 mg Tb24 Take 1 tablet by mouth once daily.    polyethylene glycol (GLYCOLAX) 17 gram/dose powder Take 17 g by mouth every 24 hours as needed. Constipation    senna-docusate 8.6-50 mg (PERICOLACE) 8.6-50 mg per tablet Take 2 tablets by mouth 2 (two) times daily as needed for Constipation.    verapamil (VERELAN) 120 MG C24P Take 120 mg by mouth once daily.    [DISCONTINUED] calcium-vitamin D tablet 600 mg-200 units Take 1 tablet by mouth 2 (two) times daily.    [DISCONTINUED] furosemide (LASIX) 20 MG tablet Take 20 mg by mouth 2 (two) times daily.    [DISCONTINUED]  methenamine (HIPREX) 1 gram Tab Take 1 g by mouth 2 (two) times daily.    [DISCONTINUED] olopatadine (PATADAY) 0.2 % Drop     [DISCONTINUED] potassium chloride SA (K-DUR,KLOR-CON) 20 MEQ tablet     [DISCONTINUED] PROCTOSOL HC 2.5 % rectal cream SMARTSIG:Topical    [DISCONTINUED] VASOLEX 90- unit-mg-mg/gram Oint      Family History    None       Tobacco Use    Smoking status: Never    Smokeless tobacco: Never   Substance and Sexual Activity    Alcohol use: No    Drug use: No    Sexual activity: Not on file     Review of Systems   Unable to perform ROS: Mental status change   Respiratory:  Positive for cough.    Musculoskeletal:  Positive for myalgias.   Objective:     Vital Signs (Most Recent):  Temp: 98.4 °F (36.9 °C) (03/05/23 1615)  Pulse: 90 (03/05/23 1615)  Resp: 18 (03/05/23 1615)  BP: (!) 122/56 (03/05/23 1615)  SpO2: 95 % (03/05/23 1615)   Vital Signs (24h Range):  Temp:  [98.4 °F (36.9 °C)-99.3 °F (37.4 °C)] 98.4 °F (36.9 °C)  Pulse:  [] 90  Resp:  [18-20] 18  SpO2:  [95 %-96 %] 95 %  BP: (122-158)/(56-85) 122/56     Weight: 81.6 kg (180 lb)  Body mass index is 31.89 kg/m².    Physical Exam  Vitals and nursing note reviewed.   Constitutional:       General: She is not in acute distress.     Appearance: She is not toxic-appearing or diaphoretic.   HENT:      Head: Normocephalic and atraumatic.      Comments: +[place]     Nose: Nose normal.      Mouth/Throat:      Mouth: Mucous membranes are dry.   Eyes:      Pupils: Pupils are equal, round, and reactive to light.   Cardiovascular:      Rate and Rhythm: Normal rate and regular rhythm.      Pulses: Normal pulses.   Pulmonary:      Effort: Pulmonary effort is normal. Tachypnea present. No respiratory distress.      Breath sounds: No wheezing, rhonchi or rales.      Comments: Coughing frequently during my exam. Currently on room air.  Abdominal:      General: Bowel sounds are normal. There is distension (mild).      Palpations: Abdomen is soft.       Tenderness: There is no abdominal tenderness. There is no guarding.   Musculoskeletal:         General: No swelling, tenderness or deformity. Normal range of motion.      Cervical back: Normal range of motion.      Right lower leg: Edema present.      Left lower leg: Edema present.   Skin:     General: Skin is warm and dry.      Capillary Refill: Capillary refill takes less than 2 seconds.   Neurological:      General: No focal deficit present.      Mental Status: She is alert.      Cranial Nerves: No dysarthria or facial asymmetry.   Psychiatric:         Behavior: Behavior is agitated.         Cognition and Memory: Cognition is impaired.         CRANIAL NERVES     CN III, IV, VI   Pupils are equal, round, and reactive to light.     Significant Labs: All pertinent labs within the past 24 hours have been reviewed.  CBC:   Recent Labs   Lab 03/05/23  1534   WBC 14.86*   HGB 13.0   HCT 38.6        CMP:   Recent Labs   Lab 03/05/23  1534   *   K 5.5*   CL 95   CO2 21*   *   BUN 18   CREATININE 1.0   CALCIUM 9.4   PROT 7.5   ALBUMIN 3.6   BILITOT 0.5   ALKPHOS 92   AST 28   ALT 18   ANIONGAP 13     TSH:   Recent Labs   Lab 03/05/23  1534   TSH 1.321     Urine Studies:   Recent Labs   Lab 03/05/23  1708   COLORU Yellow   APPEARANCEUA Clear   PHUR 5.0   SPECGRAV 1.015   PROTEINUA Negative   GLUCUA Negative   KETONESU Negative   BILIRUBINUA Negative   OCCULTUA 1+*   NITRITE Negative   LEUKOCYTESUR Negative   RBCUA 1   WBCUA 1   BACTERIA Rare   SQUAMEPITHEL 1   HYALINECASTS 2*       Significant Imaging: I have reviewed all pertinent imaging results/findings within the past 24 hours.    Imaging Results              X-Ray Chest AP Portable (Final result)  Result time 03/05/23 16:53:41      Final result by Lillie Flower MD (03/05/23 16:53:41)                   Impression:      No acute pulmonary pathology identified.  Other findings are as above.      Electronically signed by: Lillie  Ching  Date:    03/05/2023  Time:    16:53               Narrative:    EXAMINATION:  XR CHEST AP PORTABLE    CLINICAL HISTORY:  Altered mental status, unspecified    TECHNIQUE:  Single frontal view of the chest was performed.    COMPARISON:  12/29/2020    FINDINGS:  There is severe degenerative changes of both shoulders.  The lungs appear clear.  The heart size appears normal.  There is mild calcification of the aortic knob consistent with atherosclerotic stigmata.    The bone density appears diminished.                                       X-Ray Pelvis Routine AP (Final result)  Result time 03/05/23 16:48:05      Final result by Kush De La Garza MD (03/05/23 16:48:05)                   Impression:      1. Significantly limited examination given artifact from pannus, please see above.      Electronically signed by: Kush De La Garza MD  Date:    03/05/2023  Time:    16:48               Narrative:    EXAMINATION:  XR PELVIS ROUTINE AP    CLINICAL HISTORY:  hip pain;    TECHNIQUE:  AP view of the pelvis was performed.    COMPARISON:  11/04/2013    FINDINGS:  Single-view pelvis.    Please note, there is extensive artifact from overlying pannus, significantly limiting evaluation of the pelvis, particularly the sacrum.  Left sacral fracture cannot be excluded on the basis of this exam.    The pubic symphysis appears intact.  There are bilateral hip prostheses, no convincing dislocation or findings to suggest loosening.  There may be remote fracture of the right inferior pubic ramus.                                       CT Head Without Contrast (Final result)  Result time 03/05/23 16:41:58      Final result by Addi Guillermo DO (03/05/23 16:41:58)                   Impression:      No acute intracranial findings as detailed above specifically without evidence for acute intracranial hemorrhage or sulcal effacement to suggest large territory recent infarction.    Further evaluation as warranted  clinically.      Electronically signed by: Addi Guillermo DO  Date:    03/05/2023  Time:    16:41               Narrative:    EXAMINATION:  CT HEAD WITHOUT CONTRAST    CLINICAL HISTORY:  Mental status change, unknown cause;    TECHNIQUE:  Multiple sequential 5 mm axial images of the head without contrast.  Coronal and sagittal reformatted imaging from the axial acquisition.    COMPARISON:  11/25/2022    FINDINGS:  Mild cerebral volume loss.  Ventricles stable without hydrocephalus.  Ill-defined decreased attenuation supratentorial white matter while nonspecific concerning for sequela of chronic ischemic change.  Continued partially empty sella.  Allowing for scatter artifacts there is no definite acute intracranial hemorrhage or sulcal effacement to suggest large territory recent infarction.  Visualized paranasal sinuses and mastoid air cells are clear.  Continued mixed attenuation pedunculated lesion overlying the left posterior parietal/occipital calvarium suggestive for partially calcified sebaceous cyst.

## 2023-03-06 NOTE — ED NOTES
Telemetry Verification   Patient placed on Telemetry Box  Verified with War Room  Box # [id number]   Monitor Tech    Rate 82   Rhythm NSR

## 2023-03-06 NOTE — HPI
"Bisi Bazan is a 89 y.o. female with a PMHx of HTN, DVT, osteoporosis, and chronic back pain who presents to the ED from [facility] for altered mental status. HPI and ROS limited secondary to patient's change in mental status. The patient does endorse cough and all over body pain. Patient repeatedly saying "help me" during my exam. I called and spoke with patient's daughter who provided additional history. Per daughter, family visited the patient yesterday, and she was her usual self and had no complaints. Her daughter states she is oriented to person and place at baseline and is normally conversant. She is also wheelchair bound. Per daughter, family received a call this morning that the patient was yelling out and not acting like herself. Per daughter she noticed a cough today but did not notice one yesterday afternoon.     In the ED, patient initially tachycardic but otherwise VSSAF. WBC 14.86k. Na 129. K+5.5. Cr 1.0 (bl 0.8-0.9). Glucose 159. CPK 76. TSH WNL. Blood cxs in process. Flu/RSV negative. COVID positive. UA non infectious. CXR with no acute pulmonary pathology identified. Xray pelvis with extensive artifact from overlying pannus. Left sacral fracture cannot be excluded on the basis of this exam. The pubic symphysis appears intact. There are bilateral hip prostheses, no convincing dislocation or findings to suggest loosening. There may be remote fracture of the right inferior pubic ramus. CTH with no acute intracranial findings as detailed above specifically without evidence for acute intracranial hemorrhage or sulcal effacement to suggest large territory recent infarction. The patient received droperidol, tylenol, and IVF bolus.  "

## 2023-03-06 NOTE — ASSESSMENT & PLAN NOTE
Patient presents with acute AMS beginning today. Patient oriented to person and place at baseline and is normally conversant per family. Family visited her yesterday at [facility], and she was her usual self. Patient noted to have cough today which is new and complaining of all over pain.   -WBC 14.86k. Na 129. K+5.5.   -Blood cxs in process.  -COVID +  -Ammonia, vitamin panel, RPR, lactate, and procal pending.  -UA/UDS- negative  -CXR-No acute pulmonary pathology identified.  -CTH-No acute intracranial findings.  -Delirium precautions    -Will monitor neuro status carefully, avoid narcotics and benzos that will exacerbate agitation, and use PRN anti-psychotics for controls of behavior for self harm.

## 2023-03-06 NOTE — ASSESSMENT & PLAN NOTE
-Acute, likely hypovolemic hyponatremia.  -Received 1L NS in the ED.  -Urine na, urine osm  -TSH WNL.  -Trend levels

## 2023-03-06 NOTE — ASSESSMENT & PLAN NOTE
Patient is identified as High risk for severe complications of COVID 19 based on COVID risk score of 5   Initiate standard COVID protocols; COVID-19 testing ,Infection Control notification  and isolation- respiratory, contact and droplet per protocol    Diagnostics: (leukopenia, hyponatremia, hyperferritinemia, elevated troponin, elevated d-dimer, age, and comorbidities are significant predictors of poor clinical outcome)  CBC, CMP, Procalcitonin, Ferritin, CRP, LDH, BNP, Troponin, ECG, Blood Culture x2, Portable CXR, UA and culture, PTT and INR    Management: Initiate targeted therapy with Remdesivir, 200mg IV x1, followed by 100mg IV daily x3 days total, Maintain oxygen saturations 92-96% via Nasal Cannula  LPM and monitor with continuous/intermittent pulse oximetry. , Inhaled bronchodilators as needed for shortness of breath. and Continuous cardiac monitoring.

## 2023-03-06 NOTE — H&P
"[facility] - Emergency Dept  [facility]  History & Physical    Patient Name: Bisi Bazan  MRN: [id number]  Patient Class: IP- Inpatient  Admission Date: 3/5/2023  Attending Physician: Osito Byers MD   Primary Care Provider: Primary Doctor No         Patient information was obtained from patient, relative(s), past medical records, and ER records.     Subjective:     Principal Problem:COVID-19 virus infection    Chief Complaint:   Chief Complaint   Patient presents with    Pain     Complaining of generalized body pain and nausea, hx of dementia. Received 4 of zofran        HPI: Bisi Bazan is a 89 y.o. female with a PMHx of HTN, DVT, osteoporosis, and chronic back pain who presents to the ED from [facility] for altered mental status. HPI and ROS limited secondary to patient's change in mental status. The patient does endorse cough and all over body pain. Patient repeatedly saying "help me" during my exam. I called and spoke with patient's daughter who provided additional history. Per daughter, family visited the patient yesterday, and she was her usual self and had no complaints. Her daughter states she is oriented to person and place at baseline and is normally conversant. She is also wheelchair bound. Per daughter, family received a call this morning that the patient was yelling out and not acting like herself. Per daughter she noticed a cough today but did not notice one yesterday afternoon.     In the ED, patient initially tachycardic but otherwise VSSAF. WBC 14.86k. Na 129. K+5.5. Cr 1.0 (bl 0.8-0.9). Glucose 159. CPK 76. TSH WNL. Blood cxs in process. Flu/RSV negative. COVID positive. UA non infectious. CXR with no acute pulmonary pathology identified. Xray pelvis with extensive artifact from overlying pannus. Left sacral fracture cannot be excluded on the basis of this exam. The pubic symphysis appears intact. There are bilateral hip prostheses, no convincing dislocation or " findings to suggest loosening. There may be remote fracture of the right inferior pubic ramus. CTH with no acute intracranial findings as detailed above specifically without evidence for acute intracranial hemorrhage or sulcal effacement to suggest large territory recent infarction. The patient received droperidol, tylenol, and IVF bolus.    Past Medical History:   Diagnosis Date    Arthritis     Hip fracture, right     History of breast surgery        Past Surgical History:   Procedure Laterality Date    BREAST SURGERY      HIP SURGERY      HYSTERECTOMY         Review of patient's allergies indicates:   Allergen Reactions    Aspirin     Felodipine     Morphine        No current facility-administered medications on file prior to encounter.     Current Outpatient Medications on File Prior to Encounter   Medication Sig    potassium chloride (K-TAB) 20 mEq Take 20 mEq by mouth 2 (two) times a day.    acetaminophen (TYLENOL) 325 MG tablet Take 2 tablets (650 mg total) by mouth every 6 (six) hours as needed for Pain. Do NOT exceed 3000 mg in a 24 hour time period and do not take with other medications containing acetaminophen    bumetanide (BUMEX) 2 MG tablet Take 1 mg by mouth once daily. For fluid    busPIRone (BUSPAR) 10 MG tablet Take 10 mg by mouth 3 (three) times daily.    lactulose (CHRONULAC) 10 gram/15 mL solution Take 20 g by mouth daily as needed.    LIDOcaine (LIDODERM) 5 % 2 patches. Apply to both knees once a day as needed for pain ; remove per schedule    mineral oil-hydrophil petrolat Oint Apply topically as needed. Apply to topically to the perineum once a day for yeast; apply with every change    multivitamin (THERAGRAN) tablet Take 1 tablet by mouth once daily.    MYRBETRIQ 50 mg Tb24 Take 1 tablet by mouth once daily.    polyethylene glycol (GLYCOLAX) 17 gram/dose powder Take 17 g by mouth every 24 hours as needed. Constipation    senna-docusate 8.6-50 mg (PERICOLACE) 8.6-50 mg per tablet Take 2  tablets by mouth 2 (two) times daily as needed for Constipation.    verapamil (VERELAN) 120 MG C24P Take 120 mg by mouth once daily.    [DISCONTINUED] calcium-vitamin D tablet 600 mg-200 units Take 1 tablet by mouth 2 (two) times daily.    [DISCONTINUED] furosemide (LASIX) 20 MG tablet Take 20 mg by mouth 2 (two) times daily.    [DISCONTINUED] methenamine (HIPREX) 1 gram Tab Take 1 g by mouth 2 (two) times daily.    [DISCONTINUED] olopatadine (PATADAY) 0.2 % Drop     [DISCONTINUED] potassium chloride SA (K-DUR,KLOR-CON) 20 MEQ tablet     [DISCONTINUED] PROCTOSOL HC 2.5 % rectal cream SMARTSIG:Topical    [DISCONTINUED] VASOLEX 9087-078 unit-mg-mg/gram Oint      Family History    None       Tobacco Use    Smoking status: Never    Smokeless tobacco: Never   Substance and Sexual Activity    Alcohol use: No    Drug use: No    Sexual activity: Not on file     Review of Systems   Unable to perform ROS: Mental status change   Respiratory:  Positive for cough.    Musculoskeletal:  Positive for myalgias.   Objective:     Vital Signs (Most Recent):  Temp: 98.4 °F (36.9 °C) (03/05/23 1615)  Pulse: 90 (03/05/23 1615)  Resp: 18 (03/05/23 1615)  BP: (!) 122/56 (03/05/23 1615)  SpO2: 95 % (03/05/23 1615)   Vital Signs (24h Range):  Temp:  [98.4 °F (36.9 °C)-99.3 °F (37.4 °C)] 98.4 °F (36.9 °C)  Pulse:  [] 90  Resp:  [18-20] 18  SpO2:  [95 %-96 %] 95 %  BP: (122-158)/(56-85) 122/56     Weight: 81.6 kg (180 lb)  Body mass index is 31.89 kg/m².    Physical Exam  Vitals and nursing note reviewed.   Constitutional:       General: She is not in acute distress.     Appearance: She is not toxic-appearing or diaphoretic.   HENT:      Head: Normocephalic and atraumatic.      Comments: +[place]     Nose: Nose normal.      Mouth/Throat:      Mouth: Mucous membranes are dry.   Eyes:      Pupils: Pupils are equal, round, and reactive to light.   Cardiovascular:      Rate and Rhythm: Normal rate and regular rhythm.      Pulses: Normal pulses.    Pulmonary:      Effort: Pulmonary effort is normal. Tachypnea present. No respiratory distress.      Breath sounds: No wheezing, rhonchi or rales.      Comments: Coughing frequently during my exam. Currently on room air.  Abdominal:      General: Bowel sounds are normal. There is distension (mild).      Palpations: Abdomen is soft.      Tenderness: There is no abdominal tenderness. There is no guarding.   Musculoskeletal:         General: No swelling, tenderness or deformity. Normal range of motion.      Cervical back: Normal range of motion.      Right lower leg: Edema present.      Left lower leg: Edema present.   Skin:     General: Skin is warm and dry.      Capillary Refill: Capillary refill takes less than 2 seconds.   Neurological:      General: No focal deficit present.      Mental Status: She is alert.      Cranial Nerves: No dysarthria or facial asymmetry.   Psychiatric:         Behavior: Behavior is agitated.         Cognition and Memory: Cognition is impaired.         CRANIAL NERVES     CN III, IV, VI   Pupils are equal, round, and reactive to light.     Significant Labs: All pertinent labs within the past 24 hours have been reviewed.  CBC:   Recent Labs   Lab 03/05/23  1534   WBC 14.86*   HGB 13.0   HCT 38.6        CMP:   Recent Labs   Lab 03/05/23  1534   *   K 5.5*   CL 95   CO2 21*   *   BUN 18   CREATININE 1.0   CALCIUM 9.4   PROT 7.5   ALBUMIN 3.6   BILITOT 0.5   ALKPHOS 92   AST 28   ALT 18   ANIONGAP 13     TSH:   Recent Labs   Lab 03/05/23  1534   TSH 1.321     Urine Studies:   Recent Labs   Lab 03/05/23  1708   COLORU Yellow   APPEARANCEUA Clear   PHUR 5.0   SPECGRAV 1.015   PROTEINUA Negative   GLUCUA Negative   KETONESU Negative   BILIRUBINUA Negative   OCCULTUA 1+*   NITRITE Negative   LEUKOCYTESUR Negative   RBCUA 1   WBCUA 1   BACTERIA Rare   SQUAMEPITHEL 1   HYALINECASTS 2*       Significant Imaging: I have reviewed all pertinent imaging results/findings within the  past 24 hours.    Imaging Results              X-Ray Chest AP Portable (Final result)  Result time 03/05/23 16:53:41      Final result by Lillie Flower MD (03/05/23 16:53:41)                   Impression:      No acute pulmonary pathology identified.  Other findings are as above.      Electronically signed by: Lillie Flower  Date:    03/05/2023  Time:    16:53               Narrative:    EXAMINATION:  XR CHEST AP PORTABLE    CLINICAL HISTORY:  Altered mental status, unspecified    TECHNIQUE:  Single frontal view of the chest was performed.    COMPARISON:  12/29/2020    FINDINGS:  There is severe degenerative changes of both shoulders.  The lungs appear clear.  The heart size appears normal.  There is mild calcification of the aortic knob consistent with atherosclerotic stigmata.    The bone density appears diminished.                                       X-Ray Pelvis Routine AP (Final result)  Result time 03/05/23 16:48:05      Final result by Kush De La Garza MD (03/05/23 16:48:05)                   Impression:      1. Significantly limited examination given artifact from pannus, please see above.      Electronically signed by: Kush De La Garza MD  Date:    03/05/2023  Time:    16:48               Narrative:    EXAMINATION:  XR PELVIS ROUTINE AP    CLINICAL HISTORY:  hip pain;    TECHNIQUE:  AP view of the pelvis was performed.    COMPARISON:  11/04/2013    FINDINGS:  Single-view pelvis.    Please note, there is extensive artifact from overlying pannus, significantly limiting evaluation of the pelvis, particularly the sacrum.  Left sacral fracture cannot be excluded on the basis of this exam.    The pubic symphysis appears intact.  There are bilateral hip prostheses, no convincing dislocation or findings to suggest loosening.  There may be remote fracture of the right inferior pubic ramus.                                       CT Head Without Contrast (Final result)  Result time 03/05/23 16:41:58      Final  result by Addi Guillermo DO (03/05/23 16:41:58)                   Impression:      No acute intracranial findings as detailed above specifically without evidence for acute intracranial hemorrhage or sulcal effacement to suggest large territory recent infarction.    Further evaluation as warranted clinically.      Electronically signed by: Addi Guillermo DO  Date:    03/05/2023  Time:    16:41               Narrative:    EXAMINATION:  CT HEAD WITHOUT CONTRAST    CLINICAL HISTORY:  Mental status change, unknown cause;    TECHNIQUE:  Multiple sequential 5 mm axial images of the head without contrast.  Coronal and sagittal reformatted imaging from the axial acquisition.    COMPARISON:  11/25/2022    FINDINGS:  Mild cerebral volume loss.  Ventricles stable without hydrocephalus.  Ill-defined decreased attenuation supratentorial white matter while nonspecific concerning for sequela of chronic ischemic change.  Continued partially empty sella.  Allowing for scatter artifacts there is no definite acute intracranial hemorrhage or sulcal effacement to suggest large territory recent infarction.  Visualized paranasal sinuses and mastoid air cells are clear.  Continued mixed attenuation pedunculated lesion overlying the left posterior parietal/occipital calvarium suggestive for partially calcified sebaceous cyst.                                      Assessment/Plan:     * COVID-19 virus infection  Patient is identified as High risk for severe complications of COVID 19 based on COVID risk score of 5   Initiate standard COVID protocols; COVID-19 testing ,Infection Control notification  and isolation- respiratory, contact and droplet per protocol    Diagnostics: (leukopenia, hyponatremia, hyperferritinemia, elevated troponin, elevated d-dimer, age, and comorbidities are significant predictors of poor clinical outcome)  CBC, CMP, Procalcitonin, Ferritin, CRP, LDH, BNP, Troponin, ECG, Blood Culture x2, Portable CXR, UA and culture,  PTT and INR    Management: Initiate targeted therapy with Remdesivir, 200mg IV x1, followed by 100mg IV daily x3 days total, Maintain oxygen saturations 92-96% via Nasal Cannula  LPM and monitor with continuous/intermittent pulse oximetry. , Inhaled bronchodilators as needed for shortness of breath. and Continuous cardiac monitoring.    Acute encephalopathy  Patient presents with acute AMS beginning today. Patient oriented to person and place at baseline and is normally conversant per family. Family visited her yesterday at [facility], and she was her usual self. Patient noted to have cough today which is new and complaining of all over pain.   -WBC 14.86k. Na 129. K+5.5.   -Blood cxs in process.  -COVID +  -Ammonia, vitamin panel, RPR, lactate, and procal pending.  -UA/UDS- negative  -CXR-No acute pulmonary pathology identified.  -CTH-No acute intracranial findings.  -Delirium precautions    -Will monitor neuro status carefully, avoid narcotics and benzos that will exacerbate agitation, and use PRN anti-psychotics for controls of behavior for self harm.    Hyponatremia  -Acute, likely hypovolemic hyponatremia.  -Received 1L NS in the ED.  -Urine na, urine osm  -TSH WNL.  -Trend levels    Hyperkalemia  -K 5.5 on admit, receiving IVF bolus.  -Will trend.  -Cardiac monitoring.    Chronic back pain  -Continue lidocaine patches and PRN tylenol.  -Add robaxin if pain uncontrolled.    Debility  -Chronic, patient is wheelchair bound at baseline.  -Turn q2 hrs.  -Fall precautions.    HTN (hypertension)  -Chronic, controlled.  -Continue verapamil.  -Monitor BP closely.      VTE Risk Mitigation (From admission, onward)           Ordered     enoxaparin injection 40 mg  Every 24 hours (non-standard times)         03/05/23 1953     Place ALAN hose  Until discontinued         03/06/23 0058     Place sequential compression device  Until discontinued         03/06/23 0058     IP VTE HIGH RISK PATIENT  Once         03/06/23 0058                        Donna Levin NP  Department of Hospital Medicine   [facility] - Emergency Dept

## 2023-03-06 NOTE — ACP (ADVANCE CARE PLANNING)
Advance Care Planning     Date: 03/06/2023    Code Status  I engaged the the patient and family in a conversation about the patient's preferences for care in the event of a change in the patient's condition. The patient wishes to have a natural, peaceful death.  Along those lines, the patient does not wish to have CPR or other invasive treatments performed when her heart and/or breathing stops. I communicated to the patient and family that a DNR order would be placed in her medical record to reflect this preference.  I spent a total of 16 minutes engaging the patient and family in this advance care planning discussion.

## 2023-03-06 NOTE — ED NOTES
Assumed care of this patient. Pt is confused, screaming for help. 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 Pt's linens have been cleaned.

## 2023-03-06 NOTE — PROGRESS NOTES
"[facility] - Telemetry [facility] Medicine  Progress Note    Patient Name: Bisi Bazan  MRN: [id number]  Patient Class: IP- Inpatient   Admission Date: 3/5/2023  Length of Stay: 1 days  Attending Physician: Osito Byers MD  Primary Care Provider: Primary Doctor No        Subjective:     Principal Problem:COVID-19 virus infection        HPI:  Bisi Bazan is a 89 y.o. female with a PMHx of HTN, DVT, osteoporosis, and chronic back pain who presents to the ED from [facility] for altered mental status. HPI and ROS limited secondary to patient's change in mental status. The patient does endorse cough and all over body pain. Patient repeatedly saying "help me" during my exam. I called and spoke with patient's daughter who provided additional history. Per daughter, family visited the patient yesterday, and she was her usual self and had no complaints. Her daughter states she is oriented to person and place at baseline and is normally conversant. She is also wheelchair bound. Per daughter, family received a call this morning that the patient was yelling out and not acting like herself. Per daughter she noticed a cough today but did not notice one yesterday afternoon.     In the ED, patient initially tachycardic but otherwise VSSAF. WBC 14.86k. Na 129. K+5.5. Cr 1.0 (bl 0.8-0.9). Glucose 159. CPK 76. TSH WNL. Blood cxs in process. Flu/RSV negative. COVID positive. UA non infectious. CXR with no acute pulmonary pathology identified. Xray pelvis with extensive artifact from overlying pannus. Left sacral fracture cannot be excluded on the basis of this exam. The pubic symphysis appears intact. There are bilateral hip prostheses, no convincing dislocation or findings to suggest loosening. There may be remote fracture of the right inferior pubic ramus. CTH with no acute intracranial findings as detailed above specifically without evidence for acute intracranial hemorrhage or sulcal effacement to " suggest large territory recent infarction. The patient received droperidol, tylenol, and IVF bolus.      Overview/Hospital Course:  No notes on file    Interval History: VS are stable c/w remedsevir, not hypoxic    Review of Systems   Unable to perform ROS: Mental status change   Respiratory:  Positive for cough.    Musculoskeletal:  Positive for myalgias.   Objective:     Vital Signs (Most Recent):  Temp: 97.7 °F (36.5 °C) (03/06/23 0532)  Pulse: 107 (03/06/23 0532)  Resp: 17 (03/06/23 0532)  BP: 137/60 (03/06/23 0532)  SpO2: 97 % (03/06/23 0532) Vital Signs (24h Range):  Temp:  [97.7 °F (36.5 °C)-99.3 °F (37.4 °C)] 97.7 °F (36.5 °C)  Pulse:  [] 107  Resp:  [17-20] 17  SpO2:  [95 %-99 %] 97 %  BP: (112-158)/(53-85) 137/60     Weight: 81.6 kg (179 lb 14.3 oz)  Body mass index is 31.87 kg/m².    Intake/Output Summary (Last 24 hours) at 3/6/2023 0839  Last data filed at 3/5/2023 2206  Gross per 24 hour   Intake 250 ml   Output --   Net 250 ml      Physical Exam  Vitals and nursing note reviewed.   Constitutional:       General: She is not in acute distress.     Appearance: She is not toxic-appearing or diaphoretic.   HENT:      Head: Normocephalic and atraumatic.      Comments: +[place]     Nose: Nose normal.      Mouth/Throat:      Mouth: Mucous membranes are dry.   Eyes:      Pupils: Pupils are equal, round, and reactive to light.   Cardiovascular:      Rate and Rhythm: Normal rate and regular rhythm.      Pulses: Normal pulses.   Pulmonary:      Effort: Pulmonary effort is normal. Tachypnea present. No respiratory distress.      Breath sounds: No wheezing, rhonchi or rales.      Comments: Coughing frequently during my exam. Currently on room air.  Abdominal:      General: Bowel sounds are normal. There is distension (mild).      Palpations: Abdomen is soft.      Tenderness: There is no abdominal tenderness. There is no guarding.   Musculoskeletal:         General: No swelling, tenderness or deformity. Normal  range of motion.      Cervical back: Normal range of motion.      Right lower leg: Edema present.      Left lower leg: Edema present.   Skin:     General: Skin is warm and dry.      Capillary Refill: Capillary refill takes less than 2 seconds.   Neurological:      General: No focal deficit present.      Mental Status: She is alert.      Cranial Nerves: No dysarthria or facial asymmetry.   Psychiatric:         Behavior: Behavior is agitated.         Cognition and Memory: Cognition is impaired.       Significant Labs: All pertinent labs within the past 24 hours have been reviewed.  BMP:   Recent Labs   Lab 03/05/23  1534 03/05/23  2049 03/06/23  0316   *   < > 143*   *   < > 133*   K 5.5*   < > 4.5   CL 95   < > 101   CO2 21*   < > 24   BUN 18   < > 16   CREATININE 1.0   < > 0.9   CALCIUM 9.4   < > 9.4   MG 1.8  --   --     < > = values in this interval not displayed.       Significant Imaging: I have reviewed all pertinent imaging results/findings within the past 24 hours.      Assessment/Plan:      * COVID-19 virus infection  Patient is identified as High risk for severe complications of COVID 19 based on COVID risk score of 5   Initiate standard COVID protocols; COVID-19 testing ,Infection Control notification  and isolation- respiratory, contact and droplet per protocol    Diagnostics: (leukopenia, hyponatremia, hyperferritinemia, elevated troponin, elevated d-dimer, age, and comorbidities are significant predictors of poor clinical outcome)  CBC, CMP, Procalcitonin, Ferritin, CRP, LDH, BNP, Troponin, ECG, Blood Culture x2, Portable CXR, UA and culture, PTT and INR    Management: Initiate targeted therapy with Remdesivir, 200mg IV x1, followed by 100mg IV daily x3 days total, Maintain oxygen saturations 92-96% via Nasal Cannula  LPM and monitor with continuous/intermittent pulse oximetry. , Inhaled bronchodilators as needed for shortness of breath. and Continuous cardiac  monitoring.    Hyponatremia  -Acute, likely hypovolemic hyponatremia.  -Received 1L NS in the ED.  -Urine na, urine osm  -TSH WNL.  -Trend levels    Hyperkalemia  -K 5.5 on admit, receiving IVF bolus.  -Will trend.  -Cardiac monitoring.    Acute encephalopathy  Patient presents with acute AMS beginning today. Patient oriented to person and place at baseline and is normally conversant per family. Family visited her yesterday at [facility], and she was her usual self. Patient noted to have cough today which is new and complaining of all over pain.   -WBC 14.86k. Na 129. K+5.5.   -Blood cxs in process.  -COVID +  -Ammonia, vitamin panel, RPR, lactate, and procal pending.  -UA/UDS- negative  -CXR-No acute pulmonary pathology identified.  -CTH-No acute intracranial findings.  -Delirium precautions    -Will monitor neuro status carefully, avoid narcotics and benzos that will exacerbate agitation, and use PRN anti-psychotics for controls of behavior for self harm.    Chronic back pain  -Continue lidocaine patches and PRN tylenol.  -Add robaxin if pain uncontrolled.    Debility  -Chronic, patient is wheelchair bound at baseline.  -Turn q2 hrs.  -Fall precautions.    HTN (hypertension)  -Chronic, controlled.  -Continue verapamil.  -Monitor BP closely.      VTE Risk Mitigation (From admission, onward)         Ordered     enoxaparin injection 40 mg  Every 24 hours (non-standard times)         03/05/23 1953     Place ALAN hose  Until discontinued         03/06/23 0058     Place sequential compression device  Until discontinued         03/06/23 0058     IP VTE HIGH RISK PATIENT  Once         03/06/23 0058                Discharge Planning   MONTSE:      Code Status: DNR   Is the patient medically ready for discharge?:     Reason for patient still in hospital (select all that apply): Patient unstable                     Osito Byers MD  Department of Hospital Medicine   [facility] - Telemetry Stepdown

## 2023-03-06 NOTE — SUBJECTIVE & OBJECTIVE
Interval History: VS are stable c/w remedsevir, not hypoxic    Review of Systems   Unable to perform ROS: Mental status change   Respiratory:  Positive for cough.    Musculoskeletal:  Positive for myalgias.   Objective:     Vital Signs (Most Recent):  Temp: 97.7 °F (36.5 °C) (03/06/23 0532)  Pulse: 107 (03/06/23 0532)  Resp: 17 (03/06/23 0532)  BP: 137/60 (03/06/23 0532)  SpO2: 97 % (03/06/23 0532) Vital Signs (24h Range):  Temp:  [97.7 °F (36.5 °C)-99.3 °F (37.4 °C)] 97.7 °F (36.5 °C)  Pulse:  [] 107  Resp:  [17-20] 17  SpO2:  [95 %-99 %] 97 %  BP: (112-158)/(53-85) 137/60     Weight: 81.6 kg (179 lb 14.3 oz)  Body mass index is 31.87 kg/m².    Intake/Output Summary (Last 24 hours) at 3/6/2023 0839  Last data filed at 3/5/2023 2206  Gross per 24 hour   Intake 250 ml   Output --   Net 250 ml      Physical Exam  Vitals and nursing note reviewed.   Constitutional:       General: She is not in acute distress.     Appearance: She is not toxic-appearing or diaphoretic.   HENT:      Head: Normocephalic and atraumatic.      Comments: +[place]     Nose: Nose normal.      Mouth/Throat:      Mouth: Mucous membranes are dry.   Eyes:      Pupils: Pupils are equal, round, and reactive to light.   Cardiovascular:      Rate and Rhythm: Normal rate and regular rhythm.      Pulses: Normal pulses.   Pulmonary:      Effort: Pulmonary effort is normal. Tachypnea present. No respiratory distress.      Breath sounds: No wheezing, rhonchi or rales.      Comments: Coughing frequently during my exam. Currently on room air.  Abdominal:      General: Bowel sounds are normal. There is distension (mild).      Palpations: Abdomen is soft.      Tenderness: There is no abdominal tenderness. There is no guarding.   Musculoskeletal:         General: No swelling, tenderness or deformity. Normal range of motion.      Cervical back: Normal range of motion.      Right lower leg: Edema present.      Left lower leg: Edema present.   Skin:     General:  Skin is warm and dry.      Capillary Refill: Capillary refill takes less than 2 seconds.   Neurological:      General: No focal deficit present.      Mental Status: She is alert.      Cranial Nerves: No dysarthria or facial asymmetry.   Psychiatric:         Behavior: Behavior is agitated.         Cognition and Memory: Cognition is impaired.       Significant Labs: All pertinent labs within the past 24 hours have been reviewed.  BMP:   Recent Labs   Lab 03/05/23  1534 03/05/23  2049 03/06/23  0316   *   < > 143*   *   < > 133*   K 5.5*   < > 4.5   CL 95   < > 101   CO2 21*   < > 24   BUN 18   < > 16   CREATININE 1.0   < > 0.9   CALCIUM 9.4   < > 9.4   MG 1.8  --   --     < > = values in this interval not displayed.       Significant Imaging: I have reviewed all pertinent imaging results/findings within the past 24 hours.

## 2023-03-06 NOTE — PLAN OF CARE
[facility] - Telemetry Stepdown  Initial Discharge Assessment       Primary Care Provider: Primary Doctor No    Admission Diagnosis: Altered mental status [R41.82]  Left hip pain [M25.552]  AMS (altered mental status) [R41.82]  COVID-19 [U07.1]    Admission Date: 3/5/2023  Expected Discharge Date: 3/9/2023    Discharge Barriers Identified: None    Payor: HU[employer] MANAGED MEDICARE / Plan: HU[employer]NA MEDICARE PPO / Product Type: Medicare Advantage /     Extended Emergency Contact Information  Primary Emergency Contact: Lilibeth Dominique   [place]  Mobile Phone: [phone]  Relation: Daughter  Secondary Emergency Contact: Yris Quintana  Mobile Phone: [phone]  Relation: Daughter  Preferred language: English   needed? No    Discharge Plan A: Return to nursing home  Discharge Plan B: Skilled Nursing Facility      [id number] - [place], [place] - [address]  [address]  [place] [address]  Phone: [phone] Fax: [phone]      Initial Assessment (most recent)       Adult Discharge Assessment - 03/06/23 1608          Discharge Assessment    Assessment Type Discharge Planning Assessment     Confirmed/corrected address, phone number and insurance Yes     Confirmed Demographics Correct on Facesheet     Source of Information family     If unable to respond/provide information was family/caregiver contacted? Yes     Contact Name/Number Yris Quintana, daughter, [phone]     Communicated MONTSE with patient/caregiver Date not available/Unable to determine     Facility Arrived From: COVID-19 virus infection     Do you expect to return to your current living situation? Yes     Do you have help at home or someone to help you manage your care at home? Yes     Who are your caregiver(s) and their phone number(s)? [facility] staff     Walking or Climbing Stairs --   Uses W/C    Dressing/Bathing bathing difficulty, requires equipment     Home  Accessibility wheelchair accessible     Home Layout Able to live on 1st floor     Equipment Currently Used at Home wheelchair     Readmission within 30 days? No     Patient currently being followed by outpatient case management? No     Do you currently have service(s) that help you manage your care at home? Yes     Name and Contact number of agency [facility] staff     Is the pt/caregiver preference to resume services with current agency Yes     Do you take prescription medications? Yes     Do you have prescription coverage? Yes     Do you have any problems affording any of your prescribed medications? No     Is the patient taking medications as prescribed? yes     Who is going to help you get home at discharge? [facility]     How do you get to doctors appointments? agency     Are you on dialysis? No     Do you take coumadin? No     Discharge Plan A Return to nursing home     Discharge Plan B Skilled Nursing Facility     DME Needed Upon Discharge  none     Discharge Plan discussed with: Adult children     Discharge Barriers Identified None                 Varsha Westbrook LCSW  [facility]  Ext. [phone]

## 2023-03-07 LAB
ALBUMIN SERPL BCP-MCNC: 3.2 G/DL (ref 3.5–5.2)
ALP SERPL-CCNC: 76 U/L (ref 55–135)
ALT SERPL W/O P-5'-P-CCNC: 17 U/L (ref 10–44)
ANION GAP SERPL CALC-SCNC: 9 MMOL/L (ref 8–16)
AST SERPL-CCNC: 27 U/L (ref 10–40)
BASOPHILS # BLD AUTO: 0.05 K/UL (ref 0–0.2)
BASOPHILS NFR BLD: 0.3 % (ref 0–1.9)
BILIRUB SERPL-MCNC: 0.5 MG/DL (ref 0.1–1)
BUN SERPL-MCNC: 20 MG/DL (ref 10–30)
CALCIUM SERPL-MCNC: 9.2 MG/DL (ref 8.7–10.5)
CHLORIDE SERPL-SCNC: 102 MMOL/L (ref 95–110)
CO2 SERPL-SCNC: 22 MMOL/L (ref 23–29)
CREAT SERPL-MCNC: 0.9 MG/DL (ref 0.5–1.4)
DIFFERENTIAL METHOD: ABNORMAL
EOSINOPHIL # BLD AUTO: 0 K/UL (ref 0–0.5)
EOSINOPHIL NFR BLD: 0 % (ref 0–8)
ERYTHROCYTE [DISTWIDTH] IN BLOOD BY AUTOMATED COUNT: 14.6 % (ref 11.5–14.5)
EST. GFR  (NO RACE VARIABLE): >60 ML/MIN/1.73 M^2
GLUCOSE SERPL-MCNC: 171 MG/DL (ref 70–110)
HCT VFR BLD AUTO: 36.9 % (ref 37–48.5)
HGB BLD-MCNC: 11.4 G/DL (ref 12–16)
IMM GRANULOCYTES # BLD AUTO: 0.08 K/UL (ref 0–0.04)
IMM GRANULOCYTES NFR BLD AUTO: 0.6 % (ref 0–0.5)
LACTATE SERPL-SCNC: 2.1 MMOL/L (ref 0.5–2.2)
LYMPHOCYTES # BLD AUTO: 2.5 K/UL (ref 1–4.8)
LYMPHOCYTES NFR BLD: 17 % (ref 18–48)
MCH RBC QN AUTO: 31.3 PG (ref 27–31)
MCHC RBC AUTO-ENTMCNC: 30.9 G/DL (ref 32–36)
MCV RBC AUTO: 101 FL (ref 82–98)
MONOCYTES # BLD AUTO: 1.9 K/UL (ref 0.3–1)
MONOCYTES NFR BLD: 12.9 % (ref 4–15)
NEUTROPHILS # BLD AUTO: 10 K/UL (ref 1.8–7.7)
NEUTROPHILS NFR BLD: 69.2 % (ref 38–73)
NRBC BLD-RTO: 0 /100 WBC
PLATELET # BLD AUTO: 185 K/UL (ref 150–450)
PMV BLD AUTO: 9.9 FL (ref 9.2–12.9)
POTASSIUM SERPL-SCNC: 4.3 MMOL/L (ref 3.5–5.1)
PROCALCITONIN SERPL IA-MCNC: 0.55 NG/ML
PROT SERPL-MCNC: 6.5 G/DL (ref 6–8.4)
RBC # BLD AUTO: 3.64 M/UL (ref 4–5.4)
SODIUM SERPL-SCNC: 133 MMOL/L (ref 136–145)
WBC # BLD AUTO: 14.5 K/UL (ref 3.9–12.7)

## 2023-03-07 PROCEDURE — 94640 AIRWAY INHALATION TREATMENT: CPT

## 2023-03-07 PROCEDURE — 27000207 HC ISOLATION

## 2023-03-07 PROCEDURE — 99232 SBSQ HOSP IP/OBS MODERATE 35: CPT | Mod: ,,, | Performed by: INTERNAL MEDICINE

## 2023-03-07 PROCEDURE — 63600175 PHARM REV CODE 636 W HCPCS: Performed by: INTERNAL MEDICINE

## 2023-03-07 PROCEDURE — 63600175 PHARM REV CODE 636 W HCPCS: Mod: JZ,TB | Performed by: NURSE PRACTITIONER

## 2023-03-07 PROCEDURE — 25000003 PHARM REV CODE 250: Performed by: NURSE PRACTITIONER

## 2023-03-07 PROCEDURE — 99232 PR SUBSEQUENT HOSPITAL CARE,LEVL II: ICD-10-PCS | Mod: ,,, | Performed by: INTERNAL MEDICINE

## 2023-03-07 PROCEDURE — 99900026 HC AIRWAY MAINTENANCE (STAT)

## 2023-03-07 PROCEDURE — 84145 PROCALCITONIN (PCT): CPT | Performed by: NURSE PRACTITIONER

## 2023-03-07 PROCEDURE — 85025 COMPLETE CBC W/AUTO DIFF WBC: CPT | Performed by: NURSE PRACTITIONER

## 2023-03-07 PROCEDURE — 25000003 PHARM REV CODE 250: Performed by: INTERNAL MEDICINE

## 2023-03-07 PROCEDURE — 25000242 PHARM REV CODE 250 ALT 637 W/ HCPCS: Performed by: INTERNAL MEDICINE

## 2023-03-07 PROCEDURE — S0166 INJ OLANZAPINE 2.5MG: HCPCS | Performed by: NURSE PRACTITIONER

## 2023-03-07 PROCEDURE — 80053 COMPREHEN METABOLIC PANEL: CPT | Performed by: NURSE PRACTITIONER

## 2023-03-07 PROCEDURE — 20600001 HC STEP DOWN PRIVATE ROOM

## 2023-03-07 RX ORDER — SODIUM CHLORIDE 9 MG/ML
INJECTION, SOLUTION INTRAVENOUS CONTINUOUS
Status: DISCONTINUED | OUTPATIENT
Start: 2023-03-07 | End: 2023-03-09

## 2023-03-07 RX ORDER — ONDANSETRON 4 MG/1
4 TABLET, FILM COATED ORAL EVERY 6 HOURS PRN
COMMUNITY

## 2023-03-07 RX ORDER — OLANZAPINE 10 MG/2ML
2.5 INJECTION, POWDER, FOR SOLUTION INTRAMUSCULAR EVERY 8 HOURS PRN
Status: DISCONTINUED | OUTPATIENT
Start: 2023-03-07 | End: 2023-03-10 | Stop reason: HOSPADM

## 2023-03-07 RX ADMIN — LIDOCAINE 3 PATCH: 50 PATCH CUTANEOUS at 05:03

## 2023-03-07 RX ADMIN — OXYBUTYNIN CHLORIDE 10 MG: 5 TABLET, EXTENDED RELEASE ORAL at 09:03

## 2023-03-07 RX ADMIN — VERAPAMIL HYDROCHLORIDE 120 MG: 120 TABLET, FILM COATED, EXTENDED RELEASE ORAL at 09:03

## 2023-03-07 RX ADMIN — Medication 500 MCG: at 09:03

## 2023-03-07 RX ADMIN — ENOXAPARIN SODIUM 40 MG: 40 INJECTION SUBCUTANEOUS at 05:03

## 2023-03-07 RX ADMIN — OLANZAPINE 2.5 MG: 10 INJECTION, POWDER, FOR SOLUTION INTRAMUSCULAR at 03:03

## 2023-03-07 RX ADMIN — REMDESIVIR 100 MG: 100 INJECTION, POWDER, LYOPHILIZED, FOR SOLUTION INTRAVENOUS at 09:03

## 2023-03-07 RX ADMIN — LIDOCAINE 2 PATCH: 50 PATCH CUTANEOUS at 01:03

## 2023-03-07 RX ADMIN — THERA TABS 1 TABLET: TAB at 09:03

## 2023-03-07 RX ADMIN — SODIUM CHLORIDE: 9 INJECTION, SOLUTION INTRAVENOUS at 05:03

## 2023-03-07 RX ADMIN — Medication 500 MG: at 09:03

## 2023-03-07 RX ADMIN — ALBUTEROL SULFATE 2.5 MG: 2.5 SOLUTION RESPIRATORY (INHALATION) at 10:03

## 2023-03-07 RX ADMIN — CEFTRIAXONE 1 G: 1 INJECTION, POWDER, FOR SOLUTION INTRAMUSCULAR; INTRAVENOUS at 12:03

## 2023-03-07 RX ADMIN — AZITHROMYCIN MONOHYDRATE 500 MG: 500 INJECTION, POWDER, LYOPHILIZED, FOR SOLUTION INTRAVENOUS at 10:03

## 2023-03-07 NOTE — NURSING
Pt continues to be agitated and uncooperative with care. Once again pulled off telemetry monitor and 02. Pt given 2.5mg IM zyprexa as ordered by DEBORAH at this time.

## 2023-03-07 NOTE — PT/OT/SLP PROGRESS
Speech Language Pathology      Bisi Quintana [facility]  MRN: [id number]    Patient not seen today secondary to RN old. Pt currently with poor respiratory status and not appropriate for any PO trials at this time. RN further notes awaiting re-assessment from rapid response team.    Louise Donahue MS, CCC-SLP  Speech Language Pathologist  Pager: [phone]  Date 3/7/2023
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
## 2023-03-07 NOTE — PHARMACY MED REC
"Admission Medication History     The home medication history was taken by Fede Golden.    You may go to "Admission" then "Reconcile Home Medications" tabs to review and/or act upon these items.     The home medication list has been updated by the Pharmacy department.   Please read ALL comments highlighted in yellow.   Please address this information as you see fit.    Feel free to contact us if you have any questions or require assistance.      PHYSICIAN ORDERS ARE ATTACHED TO MED REC      Medications listed below were obtained from: Nursing home  PTA Medications   Medication Sig    potassium chloride (K-TAB) 20 mEq   Take 20 mEq by mouth 2 (two) times a day.    acetaminophen (TYLENOL) 325 MG tablet       Take 2 tablets (650 mg total) by mouth every 6 (six) hours as needed for Pain. Do NOT exceed 3000 mg in a 24 hour time period and do not take with other medications containing acetaminophen    bumetanide (BUMEX) 2 MG tablet   Take 1 mg by mouth once daily. For fluid    busPIRone (BUSPAR) 10 MG tablet   Take 10 mg by mouth 3 (three) times daily.    lactulose (CHRONULAC) 10 gram/15 mL solution   Take 20 g by mouth daily as needed.    LIDOcaine (LIDODERM) 5 %     2 patches. Apply to both knees once a day as needed for pain ; remove per schedule    mineral oil-hydrophil petrolat Oint     Apply topically as needed. Apply to topically to the perineum once a day for yeast; apply with every change    multivitamin (THERAGRAN) tablet   Take 1 tablet by mouth once daily.    MYRBETRIQ 50 mg Tb24   Take 1 tablet by mouth once daily.    polyethylene glycol (GLYCOLAX) 17 gram/dose powder   Take 17 g by mouth every 24 hours as needed. Constipation    senna-docusate 8.6-50 mg (PERICOLACE) 8.6-50 mg per tablet   Take 2 tablets by mouth 2 (two) times daily as needed for Constipation.    verapamil (VERELAN) 120 MG C24P   Take 120 mg by mouth once daily.         Fede Golden  EXT [id number]                  .        "

## 2023-03-07 NOTE — PROGRESS NOTES
"[facility] - Telemetry [facility] Medicine  Progress Note    Patient Name: Bisi Bazan  MRN: [id number]  Patient Class: IP- Inpatient   Admission Date: 3/5/2023  Length of Stay: 2 days  Attending Physician: Osito Byers MD  Primary Care Provider: Primary Doctor No        Subjective:     Principal Problem:COVID-19 virus infection        HPI:  Bisi Bazan is a 89 y.o. female with a PMHx of HTN, DVT, osteoporosis, and chronic back pain who presents to the ED from [facility] for altered mental status. HPI and ROS limited secondary to patient's change in mental status. The patient does endorse cough and all over body pain. Patient repeatedly saying "help me" during my exam. I called and spoke with patient's daughter who provided additional history. Per daughter, family visited the patient yesterday, and she was her usual self and had no complaints. Her daughter states she is oriented to person and place at baseline and is normally conversant. She is also wheelchair bound. Per daughter, family received a call this morning that the patient was yelling out and not acting like herself. Per daughter she noticed a cough today but did not notice one yesterday afternoon.     In the ED, patient initially tachycardic but otherwise VSSAF. WBC 14.86k. Na 129. K+5.5. Cr 1.0 (bl 0.8-0.9). Glucose 159. CPK 76. TSH WNL. Blood cxs in process. Flu/RSV negative. COVID positive. UA non infectious. CXR with no acute pulmonary pathology identified. Xray pelvis with extensive artifact from overlying pannus. Left sacral fracture cannot be excluded on the basis of this exam. The pubic symphysis appears intact. There are bilateral hip prostheses, no convincing dislocation or findings to suggest loosening. There may be remote fracture of the right inferior pubic ramus. CTH with no acute intracranial findings as detailed above specifically without evidence for acute intracranial hemorrhage or sulcal effacement to " suggest large territory recent infarction. The patient received droperidol, tylenol, and IVF bolus.      Overview/Hospital Course:  No notes on file    Interval History: Remains agitated but better than last night. T-max of 103 but lower now, WBC is up 14 - on steroids, lactate 2.1    Review of Systems   Unable to perform ROS: Mental status change   Respiratory:  Positive for cough.    Musculoskeletal:  Positive for myalgias.   Objective:     Vital Signs (Most Recent):  Temp: 98.2 °F (36.8 °C) (03/07/23 0933)  Pulse: 99 (03/07/23 0933)  Resp: (!) 22 (03/07/23 0933)  BP: (!) 130/58 (03/07/23 0933)  SpO2: 99 % (03/07/23 0933)   Vital Signs (24h Range):  Temp:  [97.2 °F (36.2 °C)-103 °F (39.4 °C)] 98.2 °F (36.8 °C)  Pulse:  [] 99  Resp:  [17-24] 22  SpO2:  [86 %-99 %] 99 %  BP: (103-143)/(50-80) 130/58     Weight: 81.6 kg (179 lb 14.3 oz)  Body mass index is 31.87 kg/m².    Intake/Output Summary (Last 24 hours) at 3/7/2023 0969  Last data filed at 3/7/2023 0245  Gross per 24 hour   Intake 500 ml   Output 550 ml   Net -50 ml      Physical Exam  Vitals and nursing note reviewed.   Constitutional:       General: She is not in acute distress.     Appearance: She is not toxic-appearing or diaphoretic.   HENT:      Head: Normocephalic and atraumatic.      Comments: +Unga     Nose: Nose normal.      Mouth/Throat:      Mouth: Mucous membranes are dry.   Eyes:      Pupils: Pupils are equal, round, and reactive to light.   Cardiovascular:      Rate and Rhythm: Normal rate and regular rhythm.      Pulses: Normal pulses.   Pulmonary:      Effort: Pulmonary effort is normal. Tachypnea present. No respiratory distress.      Breath sounds: No wheezing, rhonchi or rales.      Comments: Coughing frequently during my exam. Currently on room air.  Abdominal:      General: Bowel sounds are normal. There is distension (mild).      Palpations: Abdomen is soft.      Tenderness: There is no abdominal tenderness. There is no guarding.    Musculoskeletal:         General: No swelling, tenderness or deformity. Normal range of motion.      Cervical back: Normal range of motion.      Right lower leg: Edema present.      Left lower leg: Edema present.   Skin:     General: Skin is warm and dry.      Capillary Refill: Capillary refill takes less than 2 seconds.   Neurological:      General: No focal deficit present.      Mental Status: She is alert.      Cranial Nerves: No dysarthria or facial asymmetry.   Psychiatric:         Behavior: Behavior is agitated.         Cognition and Memory: Cognition is impaired.       Significant Labs: All pertinent labs within the past 24 hours have been reviewed.  BMP:   Recent Labs   Lab 03/05/23  1534 03/05/23  2049 03/07/23  0555   *   < > 171*   *   < > 133*   K 5.5*   < > 4.3   CL 95   < > 102   CO2 21*   < > 22*   BUN 18   < > 20   CREATININE 1.0   < > 0.9   CALCIUM 9.4   < > 9.2   MG 1.8  --   --     < > = values in this interval not displayed.       Significant Imaging: I have reviewed all pertinent imaging results/findings within the past 24 hours.      Assessment/Plan:      * COVID-19 virus infection  Patient is identified as High risk for severe complications of COVID 19 based on COVID risk score of 5   Initiate standard COVID protocols; COVID-19 testing ,Infection Control notification  and isolation- respiratory, contact and droplet per protocol    Diagnostics: (leukopenia, hyponatremia, hyperferritinemia, elevated troponin, elevated d-dimer, age, and comorbidities are significant predictors of poor clinical outcome)  CBC, CMP, Procalcitonin, Ferritin, CRP, LDH, BNP, Troponin, ECG, Blood Culture x2, Portable CXR, UA and culture, PTT and INR    Management: Initiate targeted therapy with Remdesivir, 200mg IV x1, followed by 100mg IV daily x3 days total, Maintain oxygen saturations 92-96% via Nasal Cannula  LPM and monitor with continuous/intermittent pulse oximetry. , Inhaled bronchodilators as  needed for shortness of breath. and Continuous cardiac monitoring.    Hyponatremia  -Acute, likely hypovolemic hyponatremia.  -Received 1L NS in the ED.  -Urine na, urine osm  -TSH WNL.  -Trend levels    Hyperkalemia  -K 5.5 on admit, receiving IVF bolus.  -Will trend.  -Cardiac monitoring.    Acute encephalopathy  Patient presents with acute AMS beginning today. Patient oriented to person and place at baseline and is normally conversant per family. Family visited her yesterday at [facility], and she was her usual self. Patient noted to have cough today which is new and complaining of all over pain.   -WBC 14.86k. Na 129. K+5.5.   -Blood cxs in process.  -COVID +  -Ammonia, vitamin panel, RPR, lactate, and procal pending.  -UA/UDS- negative  -CXR-No acute pulmonary pathology identified.  -CTH-No acute intracranial findings.  -Delirium precautions    -Will monitor neuro status carefully, avoid narcotics and benzos that will exacerbate agitation, and use PRN anti-psychotics for controls of behavior for self harm.    Chronic back pain  -Continue lidocaine patches and PRN tylenol.  -Add robaxin if pain uncontrolled.    Debility  -Chronic, patient is wheelchair bound at baseline.  -Turn q2 hrs.  -Fall precautions.    HTN (hypertension)  -Chronic, controlled.  -Continue verapamil.  -Monitor BP closely.      VTE Risk Mitigation (From admission, onward)           Ordered     enoxaparin injection 40 mg  Every 24 hours (non-standard times)         03/05/23 1953     Place ALAN hose  Until discontinued         03/06/23 0058     Place sequential compression device  Until discontinued         03/06/23 0058     IP VTE HIGH RISK PATIENT  Once         03/06/23 0058                    Discharge Planning   MONTSE: 3/9/2023     Code Status: DNR   Is the patient medically ready for discharge?:     Reason for patient still in hospital (select all that apply): Patient unstable  Discharge Plan A: Return to nursing home                  Osito SY  MD Zeeshan  Department of Hospital Medicine   [facility] - Telemetry Stepdown

## 2023-03-07 NOTE — NURSING
Patient remains confused and restless, continuously pulling off 02 and telemetry monitor leads. POX 89-90% on room air at this time and patient continues to have rhonchi throughout and wet, np cough. Unable to orally suction as she is biting suction catheter. Telemonitoring camera placed in room for safety precautions and prevention of the disruption of treatment devices.

## 2023-03-07 NOTE — NURSING
Upon rounds patient found confused and restless with pox 86% on room air, rhonchi throughout lungs and wet, np cough. Axillary temp 102.1. Telemetry monitor shows NSR 80-90's. Able to suction small amount of thick tan sputum orally from patient before she started biting yankauer. POX increased to 97% after patient placed on 5L nasal cannula and respiratory called for neb tx. Patient repositioned sitting up in bed and cross coverage DEBORAH notified of assessment findings.

## 2023-03-07 NOTE — CARE UPDATE
RAPID RESPONSE NURSE PROACTIVE ROUNDING NOTE       Time of Visit: 1110    Admit Date: 3/5/2023  LOS: 2  Code Status: DNR   Date of Visit: 2023  : 1931  Age: 89 y.o.  Sex: female  Race: White  Bed: 8054/8067 A:   MRN: [id number]  Was the patient discharged from an ICU this admission? No   Was the patient discharged from a PACU within last 24 hours? No   Did the patient receive conscious sedation/general anesthesia in last 24 hours? No  Was the patient in the ED within the past 24 hours? No  Was the patient on NIPPV within the past 24 hours? No   Attending Physician: Osito Byers MD  Primary Service: [facility] MED S   Time spent at the bedside: < 15 min    SITUATION    Notified by charge RN via phone call.  Reason for alert: coarse breath sounds   Called to evaluate the patient for Respiratory    BACKGROUND     Why is the patient in the hospital?: COVID-19 virus infection    Patient has a past medical history of Arthritis, Hip fracture, right, and History of breast surgery.    Last Vitals:  Temp: 98.2 °F (36.8 °C) (933)  Pulse: 88 ( 1103)  Resp: 20 ( 1022)  BP: 130/58 ( 09)  SpO2: 95 % ( 1115)    24 Hours Vitals Range:  Temp:  [97.2 °F (36.2 °C)-103 °F (39.4 °C)]   Pulse:  []   Resp:  [17-24]   BP: (103-143)/(50-80)   SpO2:  [86 %-99 %]     Labs:  Recent Labs     23  1534 23  0316 23  0555   WBC 14.86* 10.92 14.50*   HGB 13.0 12.2 11.4*   HCT 38.6 37.6 36.9*    193 185       Recent Labs     23  1534 23  2049 23  0316 23  0555   * 131* 133* 133*   K 5.5* 4.2 4.5 4.3   CL 95 101 101 102   CO2 21* 23 24 22*   CREATININE 1.0 0.9 0.9 0.9   * 133* 143* 171*   MG 1.8  --   --   --         ASSESSMENT    Called to assess patient's respiratory status.   Audible gurgling heard when entering the room.   NT suction performed, scant mucous suctioned.   Coarse breath sounds bilaterally.   4L NC, SpO2 94-95%    Hemodynamically stable at this time.     INTERVENTIONS    The patient was seen for Respiratory problem. Staff concerns included increased oxygen requirements. The following interventions were performed: supplemental oxygen.    RECOMMENDATIONS    Continue monitoring per unit protocol.     PROVIDER ESCALATION    Yes/No  no    Orders received and case discussed with NA.    Disposition: Remain in room 8052.    FOLLOW-UP    Bedside RNLisa  updated on plan of care. Instructed to call the Rapid Response Nurse, Minerva Neff RN at [phone] for additional questions or concerns.

## 2023-03-07 NOTE — NURSING
Sepsis Screen (most recent)       Sepsis Screen (IP) - 03/07/23 1006       Is the patient's history or complaint suggestive of a possible infection? Yes  -    Are there at least two of the following signs and symptoms present? Yes  -    Sepsis signs/symptoms - Tachycardia Tachycardia     >90  -    Sepsis signs/symptoms - Tachypnea Tachypnea     >20  -    Sepsis signs/symptoms - WBC WBC < 4,000 or WBC > 12,000  -    Sepsis signs/symptoms - Altered Mental Status Altered Mental Status  -    Are any of the following organ dysfunction criteria present and not considered to be due to a chronic condition? Yes  -    Organ Dysfunction Criteria Lactate > 2.0  -    Initiate Sepsis Protocol Yes   ABX ordered, timer restarted -    Reason sepsis not considered Pt. receiving appropriate management  -              User Key  (r) = Recorded By, (t) = Taken By, (c) = Cosigned By      Initials Name     Jyoti Sheppard RN

## 2023-03-08 LAB
ALBUMIN SERPL BCP-MCNC: 3.1 G/DL (ref 3.5–5.2)
ALP SERPL-CCNC: 73 U/L (ref 55–135)
ALT SERPL W/O P-5'-P-CCNC: 23 U/L (ref 10–44)
ANION GAP SERPL CALC-SCNC: 9 MMOL/L (ref 8–16)
AST SERPL-CCNC: 37 U/L (ref 10–40)
BASOPHILS # BLD AUTO: 0.03 K/UL (ref 0–0.2)
BASOPHILS NFR BLD: 0.2 % (ref 0–1.9)
BILIRUB SERPL-MCNC: 0.5 MG/DL (ref 0.1–1)
BUN SERPL-MCNC: 21 MG/DL (ref 10–30)
CALCIUM SERPL-MCNC: 9.4 MG/DL (ref 8.7–10.5)
CHLORIDE SERPL-SCNC: 104 MMOL/L (ref 95–110)
CO2 SERPL-SCNC: 23 MMOL/L (ref 23–29)
CREAT SERPL-MCNC: 0.9 MG/DL (ref 0.5–1.4)
D DIMER PPP IA.FEU-MCNC: 2.41 MG/L FEU
DIFFERENTIAL METHOD: ABNORMAL
EOSINOPHIL # BLD AUTO: 0 K/UL (ref 0–0.5)
EOSINOPHIL NFR BLD: 0.1 % (ref 0–8)
ERYTHROCYTE [DISTWIDTH] IN BLOOD BY AUTOMATED COUNT: 14.5 % (ref 11.5–14.5)
EST. GFR  (NO RACE VARIABLE): >60 ML/MIN/1.73 M^2
FERRITIN SERPL-MCNC: 1413 NG/ML (ref 20–300)
GLUCOSE SERPL-MCNC: 139 MG/DL (ref 70–110)
HCT VFR BLD AUTO: 35.9 % (ref 37–48.5)
HGB BLD-MCNC: 11.5 G/DL (ref 12–16)
IMM GRANULOCYTES # BLD AUTO: 0.06 K/UL (ref 0–0.04)
IMM GRANULOCYTES NFR BLD AUTO: 0.4 % (ref 0–0.5)
LDH SERPL L TO P-CCNC: 275 U/L (ref 110–260)
LYMPHOCYTES # BLD AUTO: 2.2 K/UL (ref 1–4.8)
LYMPHOCYTES NFR BLD: 15.6 % (ref 18–48)
MCH RBC QN AUTO: 31.6 PG (ref 27–31)
MCHC RBC AUTO-ENTMCNC: 32 G/DL (ref 32–36)
MCV RBC AUTO: 99 FL (ref 82–98)
MONOCYTES # BLD AUTO: 1.3 K/UL (ref 0.3–1)
MONOCYTES NFR BLD: 9.5 % (ref 4–15)
NEUTROPHILS # BLD AUTO: 10.3 K/UL (ref 1.8–7.7)
NEUTROPHILS NFR BLD: 74.2 % (ref 38–73)
NRBC BLD-RTO: 0 /100 WBC
PLATELET # BLD AUTO: 185 K/UL (ref 150–450)
PMV BLD AUTO: 9.8 FL (ref 9.2–12.9)
POTASSIUM SERPL-SCNC: 4.1 MMOL/L (ref 3.5–5.1)
PROT SERPL-MCNC: 6.2 G/DL (ref 6–8.4)
RBC # BLD AUTO: 3.64 M/UL (ref 4–5.4)
SODIUM SERPL-SCNC: 136 MMOL/L (ref 136–145)
WBC # BLD AUTO: 13.84 K/UL (ref 3.9–12.7)

## 2023-03-08 PROCEDURE — 80053 COMPREHEN METABOLIC PANEL: CPT | Performed by: NURSE PRACTITIONER

## 2023-03-08 PROCEDURE — 63600175 PHARM REV CODE 636 W HCPCS: Performed by: INTERNAL MEDICINE

## 2023-03-08 PROCEDURE — 25000242 PHARM REV CODE 250 ALT 637 W/ HCPCS: Performed by: INTERNAL MEDICINE

## 2023-03-08 PROCEDURE — 25000003 PHARM REV CODE 250: Performed by: NURSE PRACTITIONER

## 2023-03-08 PROCEDURE — 94640 AIRWAY INHALATION TREATMENT: CPT

## 2023-03-08 PROCEDURE — 99232 PR SUBSEQUENT HOSPITAL CARE,LEVL II: ICD-10-PCS | Mod: ,,, | Performed by: INTERNAL MEDICINE

## 2023-03-08 PROCEDURE — 82728 ASSAY OF FERRITIN: CPT | Performed by: NURSE PRACTITIONER

## 2023-03-08 PROCEDURE — 92610 EVALUATE SWALLOWING FUNCTION: CPT

## 2023-03-08 PROCEDURE — 83615 LACTATE (LD) (LDH) ENZYME: CPT | Performed by: NURSE PRACTITIONER

## 2023-03-08 PROCEDURE — 27000207 HC ISOLATION

## 2023-03-08 PROCEDURE — 25000003 PHARM REV CODE 250: Performed by: INTERNAL MEDICINE

## 2023-03-08 PROCEDURE — 36415 COLL VENOUS BLD VENIPUNCTURE: CPT | Performed by: NURSE PRACTITIONER

## 2023-03-08 PROCEDURE — 94761 N-INVAS EAR/PLS OXIMETRY MLT: CPT

## 2023-03-08 PROCEDURE — 85379 FIBRIN DEGRADATION QUANT: CPT | Performed by: NURSE PRACTITIONER

## 2023-03-08 PROCEDURE — 99232 SBSQ HOSP IP/OBS MODERATE 35: CPT | Mod: ,,, | Performed by: INTERNAL MEDICINE

## 2023-03-08 PROCEDURE — 99900035 HC TECH TIME PER 15 MIN (STAT)

## 2023-03-08 PROCEDURE — 63600175 PHARM REV CODE 636 W HCPCS: Performed by: NURSE PRACTITIONER

## 2023-03-08 PROCEDURE — 20600001 HC STEP DOWN PRIVATE ROOM

## 2023-03-08 PROCEDURE — 85025 COMPLETE CBC W/AUTO DIFF WBC: CPT | Performed by: NURSE PRACTITIONER

## 2023-03-08 RX ADMIN — LIDOCAINE 3 PATCH: 50 PATCH CUTANEOUS at 12:03

## 2023-03-08 RX ADMIN — Medication 500 MG: at 09:03

## 2023-03-08 RX ADMIN — Medication 500 MCG: at 10:03

## 2023-03-08 RX ADMIN — ALBUTEROL SULFATE 2.5 MG: 2.5 SOLUTION RESPIRATORY (INHALATION) at 02:03

## 2023-03-08 RX ADMIN — LIDOCAINE 3 PATCH: 50 PATCH CUTANEOUS at 09:03

## 2023-03-08 RX ADMIN — OXYBUTYNIN CHLORIDE 10 MG: 5 TABLET, EXTENDED RELEASE ORAL at 10:03

## 2023-03-08 RX ADMIN — Medication 500 MG: at 10:03

## 2023-03-08 RX ADMIN — VERAPAMIL HYDROCHLORIDE 120 MG: 120 TABLET, FILM COATED, EXTENDED RELEASE ORAL at 10:03

## 2023-03-08 RX ADMIN — ENOXAPARIN SODIUM 40 MG: 40 INJECTION SUBCUTANEOUS at 04:03

## 2023-03-08 RX ADMIN — THERA TABS 1 TABLET: TAB at 10:03

## 2023-03-08 RX ADMIN — CEFTRIAXONE 1 G: 1 INJECTION, POWDER, FOR SOLUTION INTRAMUSCULAR; INTRAVENOUS at 12:03

## 2023-03-08 RX ADMIN — POLYETHYLENE GLYCOL 3350 17 G: 17 POWDER, FOR SOLUTION ORAL at 09:03

## 2023-03-08 NOTE — PT/OT/SLP EVAL
Speech Language Pathology Evaluation  Bedside Swallow  Discharge Summary    Patient Name:  Bisi Bazan   MRN:  [id number]  Admitting Diagnosis: COVID-19 virus infection    Recommendations:                 General Recommendations:  Follow-up not indicated  Diet recommendations:  Regular Diet - IDDSI Level 7, Thin liquids - IDDSI Level 0   Aspiration Precautions: Standard aspiration precautions   General Precautions: Standard,    Communication strategies:  none    History:     Past Medical History:   Diagnosis Date    Arthritis     Hip fracture, right     History of breast surgery        Past Surgical History:   Procedure Laterality Date    BREAST SURGERY      HIP SURGERY      HYSTERECTOMY         Subjective     Patient awake and alert. Audible congestion, daughter present for session.     Pain/Comfort:   No c/o pain    Respiratory Status: Room air    Objective:     Oral Musculature Evaluation  Oral Musculature: WFL  Dentition: edentulous  Secretion Management: adequate  Mucosal Quality: good  Mandibular Strength and Mobility: WFL  Oral Labial Strength and Mobility: WFL  Velar Elevation: WFL  Voice Prior to PO Intake: clear    Bedside Swallow Eval:   Consistencies Assessed:  Thin liquids via straw sips x9  Solids x4      Oral Phase:   WFL    Pharyngeal Phase:   no overt clinical signs/symptoms of aspiration  no overt clinical signs/symptoms of pharyngeal dysphagia    Compensatory Strategies  None    Assessment:     Bisi Bazan is a 89 y.o. female with an SLP diagnosis of  no oropharyngeal dysphagia .  She presents with no further acute speech therapy needs at this time.      Plan:     Patient to be seen:      Plan of Care expires:     Plan of Care reviewed with:  patient   SLP Follow-Up:  No       Discharge recommendations:      Barriers to Discharge:  None    Time Tracking:     SLP Treatment Date:   03/08/23  Speech Start Time:  0922  Speech Stop Time:  0930     Speech Total Time (min):   8 min    Billable Minutes: Eval Swallow and Oral Function 8    03/08/2023

## 2023-03-08 NOTE — PLAN OF CARE
03/08/23 1017   Post-Acute Status   Post-Acute Authorization Placement   Post-Acute Placement Status Pending medical clearance/testing  ([facility]- resident)     Per MD, MONTSE is tomorrow. Spoke with Tang ([phone]) in admissions at [facility] and informed of MONTSE, clinicals sent in careport, and covid+ status. Per Tang, CDND will need NH orders prior to 2:00 PM tomorrow. Updated MD.    Varsha Westbrook, LCSW [facility]  Ext. [phone]

## 2023-03-08 NOTE — PROGRESS NOTES
"[facility] - Telemetry [facility] Medicine  Progress Note    Patient Name: Bisi Bazan  MRN: [id number]  Patient Class: IP- Inpatient   Admission Date: 3/5/2023  Length of Stay: 3 days  Attending Physician: Osito Byers MD  Primary Care Provider: Primary Doctor No        Subjective:     Principal Problem:COVID-19 virus infection        HPI:  Bisi Bazan is a 89 y.o. female with a PMHx of HTN, DVT, osteoporosis, and chronic back pain who presents to the ED from [facility] for altered mental status. HPI and ROS limited secondary to patient's change in mental status. The patient does endorse cough and all over body pain. Patient repeatedly saying "help me" during my exam. I called and spoke with patient's daughter who provided additional history. Per daughter, family visited the patient yesterday, and she was her usual self and had no complaints. Her daughter states she is oriented to person and place at baseline and is normally conversant. She is also wheelchair bound. Per daughter, family received a call this morning that the patient was yelling out and not acting like herself. Per daughter she noticed a cough today but did not notice one yesterday afternoon.     In the ED, patient initially tachycardic but otherwise VSSAF. WBC 14.86k. Na 129. K+5.5. Cr 1.0 (bl 0.8-0.9). Glucose 159. CPK 76. TSH WNL. Blood cxs in process. Flu/RSV negative. COVID positive. UA non infectious. CXR with no acute pulmonary pathology identified. Xray pelvis with extensive artifact from overlying pannus. Left sacral fracture cannot be excluded on the basis of this exam. The pubic symphysis appears intact. There are bilateral hip prostheses, no convincing dislocation or findings to suggest loosening. There may be remote fracture of the right inferior pubic ramus. CTH with no acute intracranial findings as detailed above specifically without evidence for acute intracranial hemorrhage or sulcal effacement to " suggest large territory recent infarction. The patient received droperidol, tylenol, and IVF bolus.      Overview/Hospital Course:  No notes on file    Interval History: Doing much better today, handling airway well, will start diet.    Review of Systems   Unable to perform ROS: Mental status change   Respiratory:  Positive for cough.    Musculoskeletal:  Positive for myalgias.   Objective:     Vital Signs (Most Recent):  Temp: 98.7 °F (37.1 °C) (03/08/23 0806)  Pulse: 84 (03/08/23 0806)  Resp: 18 (03/08/23 0806)  BP: (!) 125/55 (03/08/23 0806)  SpO2: 96 % (03/08/23 0806)   Vital Signs (24h Range):  Temp:  [97.6 °F (36.4 °C)-99.8 °F (37.7 °C)] 98.7 °F (37.1 °C)  Pulse:  [68-99] 84  Resp:  [17-24] 18  SpO2:  [93 %-99 %] 96 %  BP: (115-148)/(55-80) 125/55     Weight: 81.6 kg (179 lb 14.3 oz)  Body mass index is 31.87 kg/m².    Intake/Output Summary (Last 24 hours) at 3/8/2023 0917  Last data filed at 3/8/2023 0448  Gross per 24 hour   Intake 1040.52 ml   Output 725 ml   Net 315.52 ml      Physical Exam  Vitals and nursing note reviewed.   Constitutional:       General: She is not in acute distress.     Appearance: She is not toxic-appearing or diaphoretic.   HENT:      Head: Normocephalic and atraumatic.      Comments: +Apache     Nose: Nose normal.      Mouth/Throat:      Mouth: Mucous membranes are dry.   Eyes:      Pupils: Pupils are equal, round, and reactive to light.   Cardiovascular:      Rate and Rhythm: Normal rate and regular rhythm.      Pulses: Normal pulses.   Pulmonary:      Effort: Pulmonary effort is normal. Tachypnea present. No respiratory distress.      Breath sounds: No wheezing, rhonchi or rales.      Comments: Coughing frequently during my exam. Currently on room air.  Abdominal:      General: Bowel sounds are normal. There is distension (mild).      Palpations: Abdomen is soft.      Tenderness: There is no abdominal tenderness. There is no guarding.   Musculoskeletal:         General: No swelling,  tenderness or deformity. Normal range of motion.      Cervical back: Normal range of motion.      Right lower leg: Edema present.      Left lower leg: Edema present.   Skin:     General: Skin is warm and dry.      Capillary Refill: Capillary refill takes less than 2 seconds.   Neurological:      General: No focal deficit present.      Mental Status: She is alert.      Cranial Nerves: No dysarthria or facial asymmetry.   Psychiatric:         Behavior: Behavior is agitated.         Cognition and Memory: Cognition is impaired.       Significant Labs: All pertinent labs within the past 24 hours have been reviewed.  CBC:   Recent Labs   Lab 03/07/23  0555 03/08/23  0450   WBC 14.50* 13.84*   HGB 11.4* 11.5*   HCT 36.9* 35.9*    185       Significant Imaging: I have reviewed all pertinent imaging results/findings within the past 24 hours.      Assessment/Plan:      * COVID-19 virus infection  Patient is identified as High risk for severe complications of COVID 19 based on COVID risk score of 5   Initiate standard COVID protocols; COVID-19 testing ,Infection Control notification  and isolation- respiratory, contact and droplet per protocol    Diagnostics: (leukopenia, hyponatremia, hyperferritinemia, elevated troponin, elevated d-dimer, age, and comorbidities are significant predictors of poor clinical outcome)  CBC, CMP, Procalcitonin, Ferritin, CRP, LDH, BNP, Troponin, ECG, Blood Culture x2, Portable CXR, UA and culture, PTT and INR    Management: Initiate targeted therapy with Remdesivir, 200mg IV x1, followed by 100mg IV daily x3 days total, Maintain oxygen saturations 92-96% via Nasal Cannula  LPM and monitor with continuous/intermittent pulse oximetry. , Inhaled bronchodilators as needed for shortness of breath. and Continuous cardiac monitoring.    Hyponatremia  -Acute, likely hypovolemic hyponatremia.  -Received 1L NS in the ED.  -Urine na, urine osm  -TSH WNL.  -Trend levels    Hyperkalemia  -K 5.5 on  admit, receiving IVF bolus.  -Will trend.  -Cardiac monitoring.    Acute encephalopathy  Patient presents with acute AMS beginning today. Patient oriented to person and place at baseline and is normally conversant per family. Family visited her yesterday at [facility], and she was her usual self. Patient noted to have cough today which is new and complaining of all over pain.   -WBC 14.86k. Na 129. K+5.5.   -Blood cxs in process.  -COVID +  -Ammonia, vitamin panel, RPR, lactate, and procal pending.  -UA/UDS- negative  -CXR-No acute pulmonary pathology identified.  -CTH-No acute intracranial findings.  -Delirium precautions    -Will monitor neuro status carefully, avoid narcotics and benzos that will exacerbate agitation, and use PRN anti-psychotics for controls of behavior for self harm.    Chronic back pain  -Continue lidocaine patches and PRN tylenol.  -Add robaxin if pain uncontrolled.    Debility  -Chronic, patient is wheelchair bound at baseline.  -Turn q2 hrs.  -Fall precautions.    HTN (hypertension)  -Chronic, controlled.  -Continue verapamil.  -Monitor BP closely.      VTE Risk Mitigation (From admission, onward)         Ordered     enoxaparin injection 40 mg  Every 24 hours (non-standard times)         03/05/23 1953     Place ALAN hose  Until discontinued         03/06/23 0058     Place sequential compression device  Until discontinued         03/06/23 0058     IP VTE HIGH RISK PATIENT  Once         03/06/23 0058                Discharge Planning   MONTSE: 3/9/2023     Code Status: DNR   Is the patient medically ready for discharge?:     Reason for patient still in hospital (select all that apply): Patient unstable  Discharge Plan A: Return to nursing home                  Osito Byers MD  Department of Hospital Medicine   [facility] - Telemetry Stepdown

## 2023-03-08 NOTE — SUBJECTIVE & OBJECTIVE
Interval History: Doing much better today, handling airway well, will start diet.    Review of Systems   Unable to perform ROS: Mental status change   Respiratory:  Positive for cough.    Musculoskeletal:  Positive for myalgias.   Objective:     Vital Signs (Most Recent):  Temp: 98.7 °F (37.1 °C) (03/08/23 0806)  Pulse: 84 (03/08/23 0806)  Resp: 18 (03/08/23 0806)  BP: (!) 125/55 (03/08/23 0806)  SpO2: 96 % (03/08/23 0806)   Vital Signs (24h Range):  Temp:  [97.6 °F (36.4 °C)-99.8 °F (37.7 °C)] 98.7 °F (37.1 °C)  Pulse:  [68-99] 84  Resp:  [17-24] 18  SpO2:  [93 %-99 %] 96 %  BP: (115-148)/(55-80) 125/55     Weight: 81.6 kg (179 lb 14.3 oz)  Body mass index is 31.87 kg/m².    Intake/Output Summary (Last 24 hours) at 3/8/2023 0917  Last data filed at 3/8/2023 0448  Gross per 24 hour   Intake 1040.52 ml   Output 725 ml   Net 315.52 ml      Physical Exam  Vitals and nursing note reviewed.   Constitutional:       General: She is not in acute distress.     Appearance: She is not toxic-appearing or diaphoretic.   HENT:      Head: Normocephalic and atraumatic.      Comments: +[place]     Nose: Nose normal.      Mouth/Throat:      Mouth: Mucous membranes are dry.   Eyes:      Pupils: Pupils are equal, round, and reactive to light.   Cardiovascular:      Rate and Rhythm: Normal rate and regular rhythm.      Pulses: Normal pulses.   Pulmonary:      Effort: Pulmonary effort is normal. Tachypnea present. No respiratory distress.      Breath sounds: No wheezing, rhonchi or rales.      Comments: Coughing frequently during my exam. Currently on room air.  Abdominal:      General: Bowel sounds are normal. There is distension (mild).      Palpations: Abdomen is soft.      Tenderness: There is no abdominal tenderness. There is no guarding.   Musculoskeletal:         General: No swelling, tenderness or deformity. Normal range of motion.      Cervical back: Normal range of motion.      Right lower leg: Edema present.      Left lower leg:  Edema present.   Skin:     General: Skin is warm and dry.      Capillary Refill: Capillary refill takes less than 2 seconds.   Neurological:      General: No focal deficit present.      Mental Status: She is alert.      Cranial Nerves: No dysarthria or facial asymmetry.   Psychiatric:         Behavior: Behavior is agitated.         Cognition and Memory: Cognition is impaired.       Significant Labs: All pertinent labs within the past 24 hours have been reviewed.  CBC:   Recent Labs   Lab 03/07/23  0555 03/08/23  0450   WBC 14.50* 13.84*   HGB 11.4* 11.5*   HCT 36.9* 35.9*    185       Significant Imaging: I have reviewed all pertinent imaging results/findings within the past 24 hours.

## 2023-03-08 NOTE — PLAN OF CARE
Problem: Skin Injury Risk Increased  Goal: Skin Health and Integrity  Outcome: Ongoing, Progressing     Problem: Fall Injury Risk  Goal: Absence of Fall and Fall-Related Injury  Outcome: Ongoing, Progressing     Problem: Impaired Wound Healing  Goal: Optimal Wound Healing  Outcome: Ongoing, Progressing     Problem: Adult Inpatient Plan of Care  Goal: Optimal Comfort and Wellbeing  Outcome: Ongoing, Progressing     Problem: Adult Inpatient Plan of Care  Goal: Absence of Hospital-Acquired Illness or Injury  Outcome: Ongoing, Progressing

## 2023-03-09 PROBLEM — K59.00 CONSTIPATION: Status: ACTIVE | Noted: 2023-03-09

## 2023-03-09 LAB — VIT B1 BLD-MCNC: 68 UG/L (ref 38–122)

## 2023-03-09 PROCEDURE — 99233 PR SUBSEQUENT HOSPITAL CARE,LEVL III: ICD-10-PCS | Mod: ,,, | Performed by: INTERNAL MEDICINE

## 2023-03-09 PROCEDURE — 25000003 PHARM REV CODE 250: Performed by: INTERNAL MEDICINE

## 2023-03-09 PROCEDURE — 20600001 HC STEP DOWN PRIVATE ROOM

## 2023-03-09 PROCEDURE — 25000003 PHARM REV CODE 250: Performed by: NURSE PRACTITIONER

## 2023-03-09 PROCEDURE — 25000242 PHARM REV CODE 250 ALT 637 W/ HCPCS: Performed by: INTERNAL MEDICINE

## 2023-03-09 PROCEDURE — 27000207 HC ISOLATION

## 2023-03-09 PROCEDURE — 63600175 PHARM REV CODE 636 W HCPCS: Performed by: INTERNAL MEDICINE

## 2023-03-09 PROCEDURE — 63600175 PHARM REV CODE 636 W HCPCS: Performed by: NURSE PRACTITIONER

## 2023-03-09 PROCEDURE — 99233 SBSQ HOSP IP/OBS HIGH 50: CPT | Mod: ,,, | Performed by: INTERNAL MEDICINE

## 2023-03-09 PROCEDURE — 94640 AIRWAY INHALATION TREATMENT: CPT

## 2023-03-09 PROCEDURE — 94761 N-INVAS EAR/PLS OXIMETRY MLT: CPT

## 2023-03-09 RX ORDER — BUMETANIDE 1 MG/1
1 TABLET ORAL DAILY
Start: 2023-03-09

## 2023-03-09 RX ORDER — DOCUSATE SODIUM 283 MG/5ML
1 LIQUID RECTAL ONCE
Status: COMPLETED | OUTPATIENT
Start: 2023-03-09 | End: 2023-03-09

## 2023-03-09 RX ORDER — AMOXICILLIN 250 MG
2 CAPSULE ORAL 2 TIMES DAILY
Start: 2023-03-09 | End: 2023-03-19

## 2023-03-09 RX ORDER — HYDROCORTISONE ACETATE PRAMOXINE HCL 2.5; 1 G/100G; G/100G
CREAM TOPICAL 3 TIMES DAILY PRN
Start: 2023-03-09

## 2023-03-09 RX ORDER — BISACODYL 10 MG
10 SUPPOSITORY, RECTAL RECTAL DAILY PRN
Refills: 0
Start: 2023-03-09

## 2023-03-09 RX ORDER — BUMETANIDE 1 MG/1
1 TABLET ORAL DAILY
Status: DISCONTINUED | OUTPATIENT
Start: 2023-03-10 | End: 2023-03-10 | Stop reason: HOSPADM

## 2023-03-09 RX ORDER — OLOPATADINE HYDROCHLORIDE 1 MG/ML
1 SOLUTION/ DROPS OPHTHALMIC 2 TIMES DAILY PRN
Start: 2023-03-09 | End: 2024-03-08

## 2023-03-09 RX ORDER — AMOXICILLIN AND CLAVULANATE POTASSIUM 875; 125 MG/1; MG/1
1 TABLET, FILM COATED ORAL 2 TIMES DAILY
Qty: 8 TABLET | Refills: 0 | Status: SHIPPED | OUTPATIENT
Start: 2023-03-10 | End: 2023-03-14

## 2023-03-09 RX ORDER — ZINC OXIDE 20 G/100G
OINTMENT TOPICAL 2 TIMES DAILY
Refills: 0
Start: 2023-03-09

## 2023-03-09 RX ORDER — SPIRONOLACTONE 25 MG/1
25 TABLET ORAL DAILY
Qty: 30 TABLET | Refills: 11 | Status: SHIPPED | OUTPATIENT
Start: 2023-03-09 | End: 2024-03-08

## 2023-03-09 RX ORDER — ALBUTEROL SULFATE 2.5 MG/.5ML
2.5 SOLUTION RESPIRATORY (INHALATION)
Status: DISCONTINUED | OUTPATIENT
Start: 2023-03-09 | End: 2023-03-10 | Stop reason: HOSPADM

## 2023-03-09 RX ORDER — OLANZAPINE 5 MG/1
5 TABLET, ORALLY DISINTEGRATING ORAL
Status: DISCONTINUED | OUTPATIENT
Start: 2023-03-09 | End: 2023-03-10

## 2023-03-09 RX ORDER — AMOXICILLIN 250 MG
2 CAPSULE ORAL 2 TIMES DAILY
Status: DISCONTINUED | OUTPATIENT
Start: 2023-03-09 | End: 2023-03-10 | Stop reason: HOSPADM

## 2023-03-09 RX ORDER — ALUMINUM HYDROXIDE, MAGNESIUM HYDROXIDE, AND SIMETHICONE 2400; 240; 2400 MG/30ML; MG/30ML; MG/30ML
30 SUSPENSION ORAL EVERY 6 HOURS PRN
Refills: 0
Start: 2023-03-09 | End: 2024-03-08

## 2023-03-09 RX ADMIN — ALBUTEROL SULFATE 2.5 MG: 2.5 SOLUTION RESPIRATORY (INHALATION) at 07:03

## 2023-03-09 RX ADMIN — SENNOSIDES AND DOCUSATE SODIUM 2 TABLET: 50; 8.6 TABLET ORAL at 03:03

## 2023-03-09 RX ADMIN — CEFTRIAXONE 1 G: 1 INJECTION, POWDER, FOR SOLUTION INTRAMUSCULAR; INTRAVENOUS at 12:03

## 2023-03-09 RX ADMIN — ALBUTEROL SULFATE 2.5 MG: 2.5 SOLUTION RESPIRATORY (INHALATION) at 04:03

## 2023-03-09 RX ADMIN — Medication 500 MCG: at 08:03

## 2023-03-09 RX ADMIN — POLYETHYLENE GLYCOL 3350 17 G: 17 POWDER, FOR SOLUTION ORAL at 09:03

## 2023-03-09 RX ADMIN — Medication 500 MG: at 08:03

## 2023-03-09 RX ADMIN — ALBUTEROL SULFATE 2.5 MG: 2.5 SOLUTION RESPIRATORY (INHALATION) at 11:03

## 2023-03-09 RX ADMIN — Medication 500 MG: at 09:03

## 2023-03-09 RX ADMIN — SENNOSIDES AND DOCUSATE SODIUM 2 TABLET: 50; 8.6 TABLET ORAL at 09:03

## 2023-03-09 RX ADMIN — LIDOCAINE 3 PATCH: 50 PATCH CUTANEOUS at 09:03

## 2023-03-09 RX ADMIN — THERA TABS 1 TABLET: TAB at 08:03

## 2023-03-09 RX ADMIN — ENOXAPARIN SODIUM 40 MG: 40 INJECTION SUBCUTANEOUS at 04:03

## 2023-03-09 RX ADMIN — VERAPAMIL HYDROCHLORIDE 120 MG: 120 TABLET, FILM COATED, EXTENDED RELEASE ORAL at 08:03

## 2023-03-09 RX ADMIN — OLANZAPINE 5 MG: 5 TABLET, ORALLY DISINTEGRATING ORAL at 06:03

## 2023-03-09 RX ADMIN — DOCUSATE SODIUM 1 ENEMA: 283 LIQUID RECTAL at 03:03

## 2023-03-09 RX ADMIN — OXYBUTYNIN CHLORIDE 10 MG: 5 TABLET, EXTENDED RELEASE ORAL at 08:03

## 2023-03-09 NOTE — PHYSICIAN QUERY
PT Name: Bisi Bazan  MR #: [id number]    DOCUMENTATION CLARIFICATION     CDS: Adams Ruiz RN CCDS               Contact information: [email or web address]    This form is a permanent document in the medical record.     Query Date: March 9, 2023    By submitting this query, we are merely seeking further clarification of documentation. Please utilize your independent clinical judgment when addressing the question(s) below.    The Medical Record contains the following:   Indicators   Supporting Clinical Findings Location in Medical Record     x AMS, Confusion,  LOC, etc.  altered mental status. Per daughter, family visited the patient yesterday, and she was her usual self and had no complaints. Her daughter states she is oriented to person and place at baseline and is normally conversant. Per daughter, family received a call this morning that the patient was yelling out and not acting like herself    Patient remains confused and restless, continuously pulling off 02 and telemetry monitor leads. Unable to orally suction as she is biting suction catheter.      Remains agitated but better than last night. 3/5/2023 H&P            3/7/2023 Nursing notes      3/7/2023  progress notes     x Acute/Chronic Illness Acute encephalopathy  COVID-19 virus infection 3/5/2023 H&P     x Radiology Findings No acute intracranial findings as detailed above specifically without evidence for acute intracranial hemorrhage or sulcal effacement to suggest large territory recent infarction. 3/5/2023 CT head     x Electrolyte Imbalance Hyponatremia  Hyperkalemia  Na 129. K+5.5 3/5/2023 H&P     x Medication Initiate targeted therapy with Remdesivir, 200mg IV x1, followed by 100mg IV daily x3 days total  use PRN anti-psychotics for controls of behavior for self harm  Received 1L NS in the ED  Pt given 2.5mg IM zyprexa as ordered by DEBORAH at this time. 3/5/2023 H&P        3/7/2023 Nursing notes     x Treatment         Delirium  precautions    Will monitor neuro status carefully, avoid narcotics and benzos that will exacerbate agitation  Telemonitoring camera placed in room for safety precautions and prevention of the disruption of treatment devices. 3/5/2023 H&P      3/7/2023 Nursing notes     x Other Doing much better today, handling airway well, will start diet. 3/8/2023  progress notes     The noted clinical guidelines are only system guidelines and do not replace the providers clinical judgment.    The National Victoria of Neurologic Disorders and Stroke (NINDS) of the NIH describes encephalopathy as any diffuse disease of the brain that alters brain function or structure.    Provider, please clarify the diagnosis of Acute encephalopathy associated with above clinical findings.    [ x  ] Metabolic Encephalopathy - Due to electrolyte imbalance, metabolic derangements, or infectious processes, includes Septic Encephalopathy, Uremic Encephalopathy   [   ] Encephalopathy, unspecified      [   ] Other Encephalopathy (please specify): ____________________   [   ]  Clinically Undetermined       Please document in your progress notes daily for the duration of treatment until resolved, and include in your discharge summary.    References:  MANSOOR Ronquillo RN, CCDS. (2018, June 9). Notes from the Instructor: Encephalopathy tips. Retrieved October 22, 2020, from [email or web address].org/articles/note-instructor-encephalopathy-tips    ICD-9-CM Coding Clinic First Quarter 2013, Effective with discharges: October 21, 2013 [facility] § Seizure with encephalopathy due to postictal state (2013).    ICD-10-CM/PCS [employer] Integrated Codebook (Version V.20.8.10.0) [Computer software]. (2020). Retrieved October 21, 2020.    National Victoria of Neurological Disorders and Stroke. (2019, March 27). Retrieved October 22, 2020, from https://www.ninds.nih.gov/Disorders/All-Disorders/Yoskqabzvcqger-Gphcifrcpbe-Mkgo    Form No. 05499

## 2023-03-09 NOTE — PHYSICIAN QUERY
PT Name: Bisi Bazan  MR #: [id number]     DOCUMENTATION CLARIFICATION     CDS: Adams Ruiz RN CCDS               Contact information: [email or web address]    This form is a permanent document in the medical record.     Query Date: March 9, 2023    By submitting this query, we are merely seeking further clarification of documentation.  Please utilize your independent clinical judgment when addressing the question(s) below.  The Medical Record contains the following   Indicators   Supporting Clinical Findings Location in Medical Record     x SOB, EDWARDS, Wheezing, Productive Cough, Use of Accessory Muscles, etc. Tachypnea present    rhonchi throughout lungs and wet, np cough. Able to suction small amount of thick tan sputum orally from patient before she started biting yankauer.     patient continues to have rhonchi throughout and wet, np cough. Unable to orally suction as she is biting suction catheter.  3/6/2023  progress notes    3/6/2023 Nursing notes        3/7/2023 Nursing notes     x RR         ABGs         O2 sat O2 Sat 86% on RA, RR 17-24  POX 89-90% on room air at this time 3/6/2023 Vitals    Hypoxia/Hypercapnia      BiPAP/Intubation/Mechanical Ventilation       x Supplemental O2 O2 ranging from 2-5L 3/6/2023 Vitals    Home O2, Oxygen Dependence      Respiratory distress or failure       x Radiology findings No acute process.  Stable examination. 3/6/2023 Chest x-ray     x Acute/Chronic Illness COVID-19 virus infection  Hyponatremia  Acute encephalopathy 3/6/2023  progress notes     x Treatment Initiate targeted therapy with Remdesivir, 200mg IV x1, followed by 100mg IV daily x3 days total,  3/6/2023  progress notes     x Other not hypoxic 3/6/2023  progress notes       The noted clinical guidelines are only system guidelines and do not replace the providers clinical judgment.    Provider, please specify the diagnosis or diagnoses associated with above clinical findings.     [  x  ]  Acute Respiratory Failure with Hypoxia - ABG pO2 < 60 mmHg or O2 sat of <91% on room air and respiratory symptoms documented   [    ] Hypoxia Only   [    ] Other Respiratory Diagnosis (please specify): _________________   [   ] Clinically Undetermined       Please document in your progress notes daily for the duration of treatment until resolved and include in your discharge summary.     Reference:    LUANA De Guzman MD. (2020, March 13). Acute respiratory distress syndrome: Clinical features, diagnosis, and complications in adults ([id number] ALISSA French MD & [id number] DAX Ochoa MD, Eds.). Retrieved November 13, 2020, from https://www.[employer]/contents/acute-respiratory-distress-syndrome-clinical-features-diagnosis-and-complications-in-adults?search=ards&source=search_result&selectedTitle=1~150&usage_type=default&display_rank=1  Form No. 86949

## 2023-03-09 NOTE — PLAN OF CARE
[facility]     Department of Hospital Medicine     [address] [place] [address]     [phone] after hours  [phone] fax       NURSING HOME ORDERS                                     03/10/2023    Admit to Nursing Home:  detention care      Diagnoses:  Active Hospital Problems    Diagnosis  POA    *COVID-19 virus infection [U07.1]  Yes    Acute encephalopathy [G93.40]  Yes    Hyperkalemia [E87.5]  Yes    Hyponatremia [E87.1]  Yes    Chronic back pain [M54.9, G89.29]  Yes    Debility [R53.81]  Yes    HTN (hypertension) [I10]  Yes      Resolved Hospital Problems   No resolved problems to display.       Allergies:  Review of patient's allergies indicates:   Allergen Reactions    Aspirin     Felodipine     Morphine        Vitals: per unit routine  Weights MWF  If has more than 2 lb weight gain, give extra dose of bumex    Diet: mechanical soft  Yogurt with breakfast  Chocolate ice cream with lunch and dinner    Acitivities:    - Up in a chair each morning as tolerated   - May use walker, cane, or self-propelled wheelchair    Nursing Precautions:     - Aspiration precautions:             -  Upright 90 degrees before during and after meals    - Decubitus precautions:        -  for positioning   - Pressure reducing foam mattress   - Turn patient every two hours. Use wedge pillows to anchor patient   - Fall precautions    Consults  PT/OT eval and treat    LABS:  per provider preference  Current Discharge Medication List        START taking these medications    Details   albuterol sulfate 2.5 mg/0.5 mL Nebu QID x 3 days, then prn  Qty: 180 each, Refills: 11      aluminum & magnesium hydroxide-simethicone (MAALOX MAXIMUM STRENGTH) 400-400-40 mg/5 mL suspension Take 30 mLs by mouth every 6 (six) hours as needed for Indigestion.  Refills: 0      amoxicillin-clavulanate 875-125mg (AUGMENTIN) 875-125 mg per tablet Take 1 tablet by mouth 2 (two) times daily for 3  days. Start 3/11/2023        benzonatate (TESSALON) 100 MG capsule Take 1 capsule (100 mg total) by mouth 3 (three) times daily as needed for Cough.  Qty: 30 capsule, Refills: 0      bisacodyL (DULCOLAX) 10 mg Supp Place 1 suppository (10 mg total) rectally daily as needed (for constipation).  Refills: 0      hydrocortisone-pramoxine (ANALPRAM-HC) 2.5-1 % Crea Place rectally 3 (three) times daily as needed (hemorrhoids).      miconazole nitrate 2% (MICOTIN) 2 % Oint Apply topically 2 (two) times daily. Apply to perineum and sacral area BID and prn toileting  Refills: 0      olopatadine (PATANOL) 0.1 % ophthalmic solution Place 1 drop into both eyes 2 (two) times daily as needed for Allergies.      !! senna-docusate 8.6-50 mg (SENNA WITH DOCUSATE SODIUM) 8.6-50 mg per tablet Take 2 tablets by mouth 2 (two) times a day. Give scheduled for 10 days. Can hold when has multiple loose stools in one day. for 10 days      spironolactone (ALDACTONE) 25 MG tablet Take 1 tablet (25 mg total) by mouth once daily. Start 3/17/2023  Qty: 30 tablet, Refills: 11    Comments: .      zinc oxide 20 % ointment Apply topically 2 (two) times a day. Apply with miconazole BID and prn diaper change or toileting  Refills: 0       !! - Potential duplicate medications found. Please discuss with provider.        CONTINUE these medications which have CHANGED    Details   bumetanide (BUMEX) 1 MG tablet Take 1 tablet (1 mg total) by mouth once daily. For fluid Start 3/17/2023    Comments: .           CONTINUE these medications which have NOT CHANGED    Details   potassium chloride (K-TAB) 20 mEq Take 20 mEq by mouth 2 (two) times a day. Start 3/17/2023      acetaminophen (TYLENOL) 325 MG tablet Take 2 tablets (650 mg total) by mouth every 6 (six) hours as needed for Pain. Do NOT exceed 3000 mg in a 24 hour time period and do not take with other medications containing acetaminophen  Qty: 60 tablet, Refills: 4      busPIRone (BUSPAR) 10 MG tablet Take  10 mg by mouth 3 (three) times daily.      LIDOcaine (LIDODERM) 5 % 2 patches. Apply to both knees once a day as needed for pain ; remove per schedule      multivitamin (THERAGRAN) tablet Take 1 tablet by mouth once daily.      MYRBETRIQ 50 mg Tb24 Take 1 tablet by mouth once daily.      ondansetron (ZOFRAN) 4 MG tablet Take 4 mg by mouth every 6 (six) hours as needed for Nausea.      polyethylene glycol (GLYCOLAX) 17 gram/dose powder Take 17 g by mouth every 24 hours as needed. Constipation      !! senna-docusate 8.6-50 mg (PERICOLACE) 8.6-50 mg per tablet Take 2 tablets by mouth 2 (two) times daily as needed for Constipation.       !! - Potential duplicate medications found. Please discuss with provider.        STOP taking these medications       lactulose (CHRONULAC) 10 gram/15 mL solution Comments:   Reason for Stopping:         mineral oil-hydrophil petrolat Oint Comments:   Reason for Stopping:         verapamil (VERELAN) 120 MG C24P Comments:   Reason for Stopping:             _________________________________  Nimisha Bond MD  03/10/2023

## 2023-03-09 NOTE — PLAN OF CARE
Problem: Adult Inpatient Plan of Care  Goal: Plan of Care Review  Outcome: Ongoing, Progressing  Goal: Patient-Specific Goal (Individualized)  Outcome: Ongoing, Progressing  Goal: Absence of Hospital-Acquired Illness or Injury  Outcome: Ongoing, Progressing  Goal: Optimal Comfort and Wellbeing  Outcome: Ongoing, Progressing  Goal: Readiness for Transition of Care  Outcome: Ongoing, Progressing     Problem: Skin Injury Risk Increased  Goal: Skin Health and Integrity  Outcome: Ongoing, Progressing     Problem: Fall Injury Risk  Goal: Absence of Fall and Fall-Related Injury  Outcome: Ongoing, Progressing     Problem: Impaired Wound Healing  Goal: Optimal Wound Healing  Outcome: Ongoing, Progressing     Problem: Gas Exchange Impaired  Goal: Optimal Gas Exchange  Outcome: Ongoing, Progressing

## 2023-03-09 NOTE — PLAN OF CARE
Per MD, will need to see NH MAR prior to finalizing dc orders. Spoke with Tang in admissions at [facility] who reports he will have the MAR sent to SW ASA.    3:10 PM  Cut off times of 2:00 PM and 3:00 PM were missed. Faxed NH orders for dc tomorrow 3/10/23 to Tang at [facility].    3:17 PM  Received call from Tang in admissions at [facility] who states they may still be able to admit patient today. Tang to review orders and notify SW.    3:38 PM  Per Tang at [facility], nurse can call report to [phone] and patient will return to room 419B.    DOMINIC arranged stretcher transport via [facility]. Requested  time is 4:15 PM. Requested  time does not guarantee arrival time.    Updated patient's daughter, Finn, who states she does not feel patient is medically ready for dc. Explained IMM and provided with [employer] number, Finn reports she is interested in appealing the dc and would also like an update from MD. Emailed IMM to Finn at [email or web address]. Updated MD, RN, and RN/CM Supervisor. Notified [facility] to pause the transportation set up.    3:46 PM  Per MD, discontinuing discharge today due to patient not being back at mental status baseline per family, also needs to have BM today. Notified Tang at [facility] via voicemail and email. Updated [facility] for the transportation to be cancelled. MD has informed patient's daughter.    Varsha Westbrook, CODYW [facility]  Ext. [phone]

## 2023-03-10 VITALS
HEIGHT: 63 IN | RESPIRATION RATE: 18 BRPM | SYSTOLIC BLOOD PRESSURE: 120 MMHG | DIASTOLIC BLOOD PRESSURE: 64 MMHG | BODY MASS INDEX: 31.87 KG/M2 | HEART RATE: 94 BPM | TEMPERATURE: 98 F | OXYGEN SATURATION: 94 % | WEIGHT: 179.88 LBS

## 2023-03-10 LAB
BACTERIA BLD CULT: NORMAL
BACTERIA BLD CULT: NORMAL

## 2023-03-10 PROCEDURE — 99233 SBSQ HOSP IP/OBS HIGH 50: CPT | Mod: ,,, | Performed by: INTERNAL MEDICINE

## 2023-03-10 PROCEDURE — 63600175 PHARM REV CODE 636 W HCPCS: Performed by: INTERNAL MEDICINE

## 2023-03-10 PROCEDURE — 99900035 HC TECH TIME PER 15 MIN (STAT)

## 2023-03-10 PROCEDURE — 25000003 PHARM REV CODE 250: Performed by: NURSE PRACTITIONER

## 2023-03-10 PROCEDURE — 94761 N-INVAS EAR/PLS OXIMETRY MLT: CPT

## 2023-03-10 PROCEDURE — 25000242 PHARM REV CODE 250 ALT 637 W/ HCPCS: Performed by: INTERNAL MEDICINE

## 2023-03-10 PROCEDURE — 25000003 PHARM REV CODE 250: Performed by: INTERNAL MEDICINE

## 2023-03-10 PROCEDURE — 99233 PR SUBSEQUENT HOSPITAL CARE,LEVL III: ICD-10-PCS | Mod: ,,, | Performed by: INTERNAL MEDICINE

## 2023-03-10 PROCEDURE — 94640 AIRWAY INHALATION TREATMENT: CPT

## 2023-03-10 RX ORDER — BENZONATATE 100 MG/1
100 CAPSULE ORAL 3 TIMES DAILY PRN
Qty: 30 CAPSULE | Refills: 0
Start: 2023-03-10 | End: 2023-03-20

## 2023-03-10 RX ORDER — ALBUTEROL SULFATE 2.5 MG/.5ML
SOLUTION RESPIRATORY (INHALATION)
Qty: 180 EACH | Refills: 11
Start: 2023-03-10

## 2023-03-10 RX ADMIN — SENNOSIDES AND DOCUSATE SODIUM 2 TABLET: 50; 8.6 TABLET ORAL at 08:03

## 2023-03-10 RX ADMIN — CEFTRIAXONE 1 G: 1 INJECTION, POWDER, FOR SOLUTION INTRAMUSCULAR; INTRAVENOUS at 11:03

## 2023-03-10 RX ADMIN — ALBUTEROL SULFATE 2.5 MG: 2.5 SOLUTION RESPIRATORY (INHALATION) at 07:03

## 2023-03-10 RX ADMIN — BUMETANIDE 1 MG: 1 TABLET ORAL at 08:03

## 2023-03-10 RX ADMIN — Medication 500 MG: at 08:03

## 2023-03-10 RX ADMIN — ALBUTEROL SULFATE 2.5 MG: 2.5 SOLUTION RESPIRATORY (INHALATION) at 03:03

## 2023-03-10 RX ADMIN — Medication 500 MCG: at 08:03

## 2023-03-10 RX ADMIN — ALBUTEROL SULFATE 2.5 MG: 2.5 SOLUTION RESPIRATORY (INHALATION) at 11:03

## 2023-03-10 RX ADMIN — THERA TABS 1 TABLET: TAB at 09:03

## 2023-03-10 NOTE — NURSING
Bedside handoff report complete. She is resting quiet with eyes closed. Her respirations are even and unlabored.no sign of distress noted at this time. Her daughter Yris called and ask for a clinical update.  I have reached out to the attending.

## 2023-03-10 NOTE — PLAN OF CARE
[facility] - Telemetry Stepdown  Discharge Final Note    Primary Care Provider: Primary Doctor No    Expected Discharge Date: 3/10/2023    Final Discharge Note (most recent)       Final Note - 03/10/23 1410          Final Note    Assessment Type Final Discharge Note     Anticipated Discharge Disposition nursing home Nursing Home   [facility] Resources/Appts/Education Provided --   per NH/SNF       Post-Acute Status    Post-Acute Authorization Placement     Post-Acute Placement Status Set-up Complete/Auth obtained   [facility]- per Tang, they will provide PT/OT eval and treat per the NH orders    Discharge Delays None known at this time                 Varsha Westbrook, LCSW [facility]  Ext. [phone]

## 2023-03-10 NOTE — PLAN OF CARE
Sent updated NH orders to Tang at [facility].    12:16 PM  Spoke with Tang at [facility] who reports they can provide therapy under part B of patient's insurance. Spoke with patient's daughter, Lilibeth, and updated. Lilibeth has concerns regarding patient's current status and has medical questions. Updated MD with request to call patient's daughter.    1:59 PM  Updated NH orders were sent to Tang at [facility].    Spoke with patient's daughter, Finn, and updated on current dc status and transport will be scheduled for 4:00 PM, also that MD will contact her/family later today.     Spoke with Tang and confirmed the orders have been received and they are ready for patient today.    Per Tang in admissions at [facility], nurse can call report to [phone] and patient will return to room 419 B. Updated RN and MD.    Per MD, patient is cleared for discharge today and SW can schedule transportation.    SW arranged stretcher transport via [facility]. Requested  time is 4:00 PM. Requested  time does not guarantee arrival time.    3:35 PM  Sent updated NH orders to Tang in admissions at [facility].    Varsha Westbrook, SINAI  [facility]  Ext. [phone]

## 2023-03-10 NOTE — NURSING
End of shift note    Alert- disoriented  2L NC  Enema- BM x1  Turn q2  VSS  NADN- safety checks performed  Call light in reach

## 2023-03-10 NOTE — PROGRESS NOTES
Hospital Medicine  Progress note    Team: [facility] Nimisha Bond MD  Admit Date: 3/5/2023    Principal Problem:  COVID-19 virus infection    Interval hx: Still confused per family. No BM since admission    ROS   Unable to determine    PEx  Temp:  [96.7 °F (35.9 °C)-98.8 °F (37.1 °C)]   Pulse:  [69-94]   Resp:  [16-20]   BP: (133-149)/(61-69)   SpO2:  [94 %-97 %]     Intake/Output Summary (Last 24 hours) at 3/9/2023 2336  Last data filed at 3/9/2023 0546  Gross per 24 hour   Intake 543 ml   Output 250 ml   Net 293 ml     General Appearance: no acute distress, WDWN  Heart: regular rate and rhythm, no heave  Respiratory: Normal respiratory effort, symmetric excursion  Abdomen: Soft, non-tender; bowel sounds active, slightly distended  Skin: intact, no rash, no ulcers  Neurologic:  No focal numbness or weakness  Mental status: Alert, unable to stated name, affect appropriate     Recent Labs   Lab 03/06/23 0316 03/07/23  0555 03/08/23  0450   WBC 10.92 14.50* 13.84*   HGB 12.2 11.4* 11.5*   HCT 37.6 36.9* 35.9*    185 185     Recent Labs   Lab 03/05/23  1534 03/05/23 2049 03/06/23 0316 03/07/23  0555 03/08/23  0450   *   < > 133* 133* 136   K 5.5*   < > 4.5 4.3 4.1   CL 95   < > 101 102 104   CO2 21*   < > 24 22* 23   BUN 18   < > 16 20 21   CREATININE 1.0   < > 0.9 0.9 0.9   *   < > 143* 171* 139*   CALCIUM 9.4   < > 9.4 9.2 9.4   MG 1.8  --   --   --   --     < > = values in this interval not displayed.     Recent Labs   Lab 03/05/23 2049 03/06/23 0316 03/07/23  0555 03/08/23  0450   ALKPHOS  --  84 76 73   ALT  --  15 17 23   AST  --  18 27 37   ALBUMIN  --  3.4* 3.2* 3.1*   PROT  --  6.8 6.5 6.2   BILITOT  --  0.5 0.5 0.5   INR 1.0  --   --   --         Recent Labs   Lab 03/05/23  1610   POCTGLUCOSE 170*       Scheduled Meds:   albuterol sulfate  2.5 mg Nebulization Q4H WAKE    ascorbic acid (vitamin C)  500 mg Oral BID    [START ON 3/10/2023] bumetanide  1 mg Oral Daily    cefTRIAXone  (ROCEPHIN) IVPB  1 g Intravenous Q24H    cyanocobalamin  500 mcg Oral Daily    enoxaparin  40 mg Subcutaneous Q24H    LIDOcaine  3 patch Transdermal Q24H    multivitamin  1 tablet Oral Daily    OLANZapine zydis  5 mg Oral After dinner    senna-docusate 8.6-50 mg  2 tablet Oral BID    verapamiL  120 mg Oral Daily     Continuous Infusions:  As Needed:  acetaminophen, albuterol **AND** MDI Q6H PRN, melatonin, methocarbamoL, OLANZapine, ondansetron, polyethylene glycol, sodium chloride 0.9%    Assessment and Plan  / Problems managed today    * COVID-19 virus infection  Patient is identified as High risk for severe complications of COVID 19 based on COVID risk score of 5   Initiate standard COVID protocols; COVID-19 testing ,Infection Control notification  and isolation- respiratory, contact and droplet per protocol    Diagnostics: (leukopenia, hyponatremia, hyperferritinemia, elevated troponin, elevated d-dimer, age, and comorbidities are significant predictors of poor clinical outcome)  CBC, CMP, Procalcitonin, Ferritin, CRP, LDH, BNP, Troponin, ECG, Blood Culture x2, Portable CXR, UA and culture, PTT and INR    Management: Initiate targeted therapy with Remdesivir, 200mg IV x1, followed by 100mg IV daily x3 days total, Maintain oxygen saturations 92-96% via Nasal Cannula  LPM and monitor with continuous/intermittent pulse oximetry. , Inhaled bronchodilators as needed for shortness of breath. and Continuous cardiac monitoring.    Wean oxygen    Constipation  Docusate enema once  Senokot ii po BID    Hyponatremia  -Acute, likely hypovolemic hyponatremia.  -Received 1L NS in the ED.  -Urine na, urine osm  -TSH WNL.  -Trend levels    Hyperkalemia  -K 5.5 on admit, receiving IVF bolus.  -Will trend.  -Cardiac monitoring.    Acute encephalopathy  Patient presents with acute AMS beginning today. Patient oriented to person and place at baseline and is normally conversant per family. Family visited her yesterday at [facility],  and she was her usual self. Patient noted to have cough today which is new and complaining of all over pain.   -WBC 14.86k. Na 129. K+5.5.   -Blood cxs in process.  -COVID +  -Ammonia, vitamin panel, RPR, lactate, and procal pending.  -UA/UDS- negative  -CXR-No acute pulmonary pathology identified.  -CTH-No acute intracranial findings.  -Delirium precautions    -Will monitor neuro status carefully, avoid narcotics and benzos that will exacerbate agitation, and use PRN anti-psychotics for controls of behavior for self harm.  -zyprexa 5 mg after dinner  -encourage BM    Chronic back pain  -Continue lidocaine patches and PRN tylenol.  -Add robaxin if pain uncontrolled.    Debility  -Chronic, patient is wheelchair bound at baseline.  -Turn q2 hrs.  -Fall precautions.    HTN (hypertension)  -Chronic, controlled.  -Continue verapamil.  -Monitor BP closely.        Discharge Planning   MONTSE: 3/10/2023     Code Status: DNR   Is the patient medically ready for discharge?:     Reason for patient still in hospital (select all that apply): Patient trending condition and Treatment  Discharge Plan A: Return to nursing home   Discharge Delays: Orders Needed (Cut off time for NH orders is 2pm)    Diet:  low sodium  GI PPx: not needed  DVT PPx:  enoxaparin  Airways: room air  Wounds: none    Goals of Care:  Return to prior functional status     Time (minutes) spent in care of the patient (Greater than 1/2 spent in direct face-to-face contact and care coordination on unit) 35 min    Nimisha Bond MD

## 2023-03-10 NOTE — PLAN OF CARE
Problem: Adult Inpatient Plan of Care  Goal: Plan of Care Review  Outcome: Met  Goal: Patient-Specific Goal (Individualized)  Outcome: Met  Goal: Absence of Hospital-Acquired Illness or Injury  Outcome: Met  Goal: Optimal Comfort and Wellbeing  Outcome: Met  Goal: Readiness for Transition of Care  Outcome: Met     Problem: Skin Injury Risk Increased  Goal: Skin Health and Integrity  Outcome: Met     Problem: Fall Injury Risk  Goal: Absence of Fall and Fall-Related Injury  Outcome: Met     Problem: Impaired Wound Healing  Goal: Optimal Wound Healing  Outcome: Met     Problem: Gas Exchange Impaired  Goal: Optimal Gas Exchange  Outcome: Met   She is discharged now to [facility] via ambulance transport. All LDA's removed.  I notified her daughter Yris that she was leaving [facility] and she voiced understanding.  Report is called to [facility].  She is awake and pleasant at discharge.  No sign of distress noted at this time.

## 2023-03-10 NOTE — ASSESSMENT & PLAN NOTE
Patient presents with acute AMS beginning today. Patient oriented to person and place at baseline and is normally conversant per family. Family visited her yesterday at [facility], and she was her usual self. Patient noted to have cough today which is new and complaining of all over pain.   -WBC 14.86k. Na 129. K+5.5.   -Blood cxs in process.  -COVID +  -Ammonia, vitamin panel, RPR, lactate, and procal pending.  -UA/UDS- negative  -CXR-No acute pulmonary pathology identified.  -CTH-No acute intracranial findings.  -Delirium precautions    -Will monitor neuro status carefully, avoid narcotics and benzos that will exacerbate agitation, and use PRN anti-psychotics for controls of behavior for self harm.  -zyprexa 5 mg after dinner  -encourage BM

## 2023-03-10 NOTE — ASSESSMENT & PLAN NOTE
Patient is identified as High risk for severe complications of COVID 19 based on COVID risk score of 5   Initiate standard COVID protocols; COVID-19 testing ,Infection Control notification  and isolation- respiratory, contact and droplet per protocol    Diagnostics: (leukopenia, hyponatremia, hyperferritinemia, elevated troponin, elevated d-dimer, age, and comorbidities are significant predictors of poor clinical outcome)  CBC, CMP, Procalcitonin, Ferritin, CRP, LDH, BNP, Troponin, ECG, Blood Culture x2, Portable CXR, UA and culture, PTT and INR    Management: Initiate targeted therapy with Remdesivir, 200mg IV x1, followed by 100mg IV daily x3 days total, Maintain oxygen saturations 92-96% via Nasal Cannula  LPM and monitor with continuous/intermittent pulse oximetry. , Inhaled bronchodilators as needed for shortness of breath. and Continuous cardiac monitoring.    Wean oxygen

## 2023-03-13 NOTE — DISCHARGE SUMMARY
"Discharge Summary  Hospital Medicine    Attending Provider on Discharge: Nimisha Bond MD  [facility] Medicine Team: [facility] S  Date of Admission:  3/5/2023     Date of Discharge:  3/10/2023  Code status: Full Code    Active Hospital Problems    Diagnosis  POA    *COVID-19 virus infection [U07.1]  Yes    Constipation [K59.00]  Yes    Acute encephalopathy [G93.40]  Yes    Hyperkalemia [E87.5]  Yes    Hyponatremia [E87.1]  Yes    Chronic back pain [M54.9, G89.29]  Yes    Debility [R53.81]  Yes    HTN (hypertension) [I10]  Yes      Resolved Hospital Problems   No resolved problems to display.     HPI  Bisi Bazan is a 89 y.o. female with a PMHx of HTN, DVT, osteoporosis, and chronic back pain who presents to the ED from [facility] for altered mental status. HPI and ROS limited secondary to patient's change in mental status. The patient does endorse cough and all over body pain. Patient repeatedly saying "help me" during my exam. I called and spoke with patient's daughter who provided additional history. Per daughter, family visited the patient yesterday, and she was her usual self and had no complaints. Her daughter states she is oriented to person and place at baseline and is normally conversant. She is also wheelchair bound. Per daughter, family received a call this morning that the patient was yelling out and not acting like herself. Per daughter she noticed a cough today but did not notice one yesterday afternoon.     In the ED, patient initially tachycardic but otherwise VSSAF. WBC 14.86k. Na 129. K+5.5. Cr 1.0 (bl 0.8-0.9). Glucose 159. CPK 76. TSH WNL. Blood cxs in process. Flu/RSV negative. COVID positive. UA non infectious. CXR with no acute pulmonary pathology identified. Xray pelvis with extensive artifact from overlying pannus. Left sacral fracture cannot be excluded on the basis of this exam. The pubic symphysis appears intact. There are bilateral hip prostheses, no convincing dislocation " or findings to suggest loosening. There may be remote fracture of the right inferior pubic ramus. CTH with no acute intracranial findings as detailed above specifically without evidence for acute intracranial hemorrhage or sulcal effacement to suggest large territory recent infarction. The patient received droperidol, tylenol, and IVF bolus.    Hospital Course    * COVID-19 virus infection  Acute pneumonia  Patient is identified as High risk for severe complications of COVID 19 based on COVID risk score of 5   Initiate standard COVID protocols; COVID-19 testing ,Infection Control notification  and isolation- respiratory, contact and droplet per protocol    Diagnostics: (leukopenia, hyponatremia, hyperferritinemia, elevated troponin, elevated d-dimer, age, and comorbidities are significant predictors of poor clinical outcome)  CBC, CMP, Procalcitonin, Ferritin, CRP, LDH, BNP, Troponin, ECG, Blood Culture x2, Portable CXR, UA and culture, PTT and INR    Management: Initiate targeted therapy with Remdesivir, 200mg IV x1, followed by 100mg IV daily x3 days total, Maintain oxygen saturations 92-96% via Nasal Cannula  LPM and monitor with continuous/intermittent pulse oximetry. , Inhaled bronchodilators as needed for shortness of breath. and Continuous cardiac monitoring.    Patient had no documented hypoxia. She was weaned off oxygen. Patient was empirically started with ceftriaxone. She was given a dose of azithromycin. Patient was discharged to   Patient was weaned to room air. She was discharged on augmentin to complete 7 days total antibiotic therapy. Patient will also continue albuterol TID x 3 days and prn.     Constipation  Docusate enema once and effective  Senokot ii po BID    Hyponatremia  -Acute, likely hypovolemic hyponatremia. -Received 1L NS in the ED. Patient received IVF at 50 mL/h during most of stay.  TSH WNL. Chronic diuretic held. home bumex to restart in one week when her mental status and intake to  return to baseline.     Hyperkalemia  -K 5.5 on admit, receiving IVF bolus.  -Will trend.  -Cardiac monitoring.    Acute encephalopathy  Patient presents with acute AMS beginning today. Patient oriented to person and place at baseline and is normally conversant per family. Family visited her yesterday at [facility], and she was her usual self. Patient noted to have cough today which is new and complaining of all over pain. Delirium precautions. Patient not sleeping at night. She was given a dose of zyprex after dinner. Her mentation improved the following day to where she was A&Ox2, but was somnolent. She did not have much intake.     Chronic back pain  -Continue lidocaine patches and PRN tylenol.  -Add robaxin if pain uncontrolled.    Debility  -Chronic, patient is wheelchair bound at baseline. She is able to transfer with supervision. She was not taken out bed during stay. Upon discharge, she was mod assist with transfers. NH to provide PT/OT eval.    HTN (hypertension)  -Chronic, controlled.  -patient not on any antihypertensives at NH. Stopped low dose verapamil.    Procedures: none    Consultants: none    Discharge Medication List as of 3/10/2023  5:08 PM        START taking these medications    Details   albuterol sulfate 2.5 mg/0.5 mL Nebu QID x 3 days, then prn, No Print      aluminum & magnesium hydroxide-simethicone (MAALOX MAXIMUM STRENGTH) 400-400-40 mg/5 mL suspension Take 30 mLs by mouth every 6 (six) hours as needed for Indigestion., Starting Thu 3/9/2023, Until Fri 3/8/2024 at 2359, No Print      amoxicillin-clavulanate 875-125mg (AUGMENTIN) 875-125 mg per tablet Take 1 tablet by mouth 2 (two) times daily. for 4 days, Starting Fri 3/10/2023, Until Tue 3/14/2023, Normal      benzonatate (TESSALON) 100 MG capsule Take 1 capsule (100 mg total) by mouth 3 (three) times daily as needed for Cough., Starting Fri 3/10/2023, Until Mon 3/20/2023 at 2359, No Print      bisacodyL (DULCOLAX) 10 mg Supp Place 1  suppository (10 mg total) rectally daily as needed (for constipation)., Starting Thu 3/9/2023, No Print      hydrocortisone-pramoxine (ANALPRAM-HC) 2.5-1 % Crea Place rectally 3 (three) times daily as needed (hemorrhoids)., Starting Thu 3/9/2023, No Print      miconazole nitrate 2% (MICOTIN) 2 % Oint Apply topically 2 (two) times daily. Apply to perineum and sacral area BID and prn toileting, Starting Thu 3/9/2023, No Print      olopatadine (PATANOL) 0.1 % ophthalmic solution Place 1 drop into both eyes 2 (two) times daily as needed for Allergies., Starting Thu 3/9/2023, Until Fri 3/8/2024 at 2359, No Print      !! senna-docusate 8.6-50 mg (SENNA WITH DOCUSATE SODIUM) 8.6-50 mg per tablet Take 2 tablets by mouth 2 (two) times a day. Give scheduled for 10 days. Can hold when has multiple loose stools in one day. for 10 days, Starting Thu 3/9/2023, Until Sun 3/19/2023, No Print      spironolactone (ALDACTONE) 25 MG tablet Take 1 tablet (25 mg total) by mouth once daily., Starting Thu 3/9/2023, Until Fri 3/8/2024, Normal      zinc oxide 20 % ointment Apply topically 2 (two) times a day. Apply with miconazole BID and prn diaper change or toileting, Starting Thu 3/9/2023, No Print       !! - Potential duplicate medications found. Please discuss with provider.        CONTINUE these medications which have CHANGED    Details   bumetanide (BUMEX) 1 MG tablet Take 1 tablet (1 mg total) by mouth once daily. For fluid, Starting Thu 3/9/2023, No Print           CONTINUE these medications which have NOT CHANGED    Details   potassium chloride (K-TAB) 20 mEq Take 20 mEq by mouth 2 (two) times a day., Starting Mon 5/16/2022, Historical Med      acetaminophen (TYLENOL) 325 MG tablet Take 2 tablets (650 mg total) by mouth every 6 (six) hours as needed for Pain. Do NOT exceed 3000 mg in a 24 hour time period and do not take with other medications containing acetaminophen, Starting 9/30/2013, Until Discontinued, OTC      busPIRone  (BUSPAR) 10 MG tablet Take 10 mg by mouth 3 (three) times daily., Until Discontinued, Historical Med      LIDOcaine (LIDODERM) 5 % 2 patches. Apply to both knees once a day as needed for pain ; remove per schedule, Starting Fri 2/3/2023, Historical Med      multivitamin (THERAGRAN) tablet Take 1 tablet by mouth once daily., Starting 9/30/2013, Until Discontinued, OTC      MYRBETRIQ 50 mg Tb24 Take 1 tablet by mouth once daily., Starting Thu 7/1/2021, Historical Med      ondansetron (ZOFRAN) 4 MG tablet Take 4 mg by mouth every 6 (six) hours as needed for Nausea., Historical Med      polyethylene glycol (GLYCOLAX) 17 gram/dose powder Take 17 g by mouth every 24 hours as needed. Constipation, Historical Med      !! senna-docusate 8.6-50 mg (PERICOLACE) 8.6-50 mg per tablet Take 2 tablets by mouth 2 (two) times daily as needed for Constipation., Starting 9/30/2013, Until Discontinued, OTC       !! - Potential duplicate medications found. Please discuss with provider.        STOP taking these medications       lactulose (CHRONULAC) 10 gram/15 mL solution Comments:   Reason for Stopping:         mineral oil-hydrophil petrolat Oint Comments:   Reason for Stopping:         verapamil (VERELAN) 120 MG C24P Comments:   Reason for Stopping:               Discharge Diet:regular diet and 2 gram sodium diet     Activity: activity as tolerated    Discharge Condition: Good    Disposition: Home or Self Care    Tests pending at the time of discharge: none      Time spent  on the discharge of the patient including review of hospital course with the patient. reviewing discharge medications and arranging follow-up care 35 min    Discharge examination Patient was seen and examined on the date of discharge and determined to be suitable for discharge.    Discharge plan   NH provider    Nimisha Bond MD

## 2023-09-01 ENCOUNTER — TELEPHONE (OUTPATIENT)
Dept: ORTHOPEDICS | Facility: CLINIC | Age: 88
End: 2023-09-01
Payer: MEDICARE

## 2023-09-01 NOTE — TELEPHONE ENCOUNTER
"----- Message from Digna Moon Sorto sent at 9/1/2023  1:26 PM CDT -----  Type:  Sooner Apoointment Request    Caller is requesting a sooner appointment.  Caller declined first available appointment listed below.  Caller will not accept being placed on the waitlist and is requesting a message be sent to doctor.  Name of Caller:Pt's nurse  When is the first available appointment?n/a  Would the patient rather a call back or a response via [facility]? Call  Best Call Back Number:[phone] ext [phone]  Additional Information: fall     "User not available"  Micheline King  I left my name & number  for them to call me back   "